# Patient Record
Sex: FEMALE | Race: WHITE | NOT HISPANIC OR LATINO | Employment: OTHER | URBAN - METROPOLITAN AREA
[De-identification: names, ages, dates, MRNs, and addresses within clinical notes are randomized per-mention and may not be internally consistent; named-entity substitution may affect disease eponyms.]

---

## 2017-01-11 PROBLEM — K21.00 BILE REFLUX ESOPHAGITIS: Status: ACTIVE | Noted: 2017-01-11

## 2017-01-11 PROBLEM — Z90.3 S/P GASTRECTOMY: Status: ACTIVE | Noted: 2017-01-11

## 2017-01-11 PROBLEM — R13.14 PHARYNGOESOPHAGEAL DYSPHAGIA: Status: ACTIVE | Noted: 2017-01-11

## 2017-01-11 PROBLEM — K22.10 ULCER OF ESOPHAGUS WITHOUT BLEEDING: Status: ACTIVE | Noted: 2017-01-11

## 2017-04-05 ENCOUNTER — TELEPHONE (OUTPATIENT)
Dept: GASTROENTEROLOGY | Facility: CLINIC | Age: 72
End: 2017-04-05

## 2017-04-05 DIAGNOSIS — C16.9 MALIGNANT NEOPLASM OF STOMACH, UNSPECIFIED LOCATION: Primary | ICD-10-CM

## 2017-04-10 ENCOUNTER — TELEPHONE (OUTPATIENT)
Dept: GASTROENTEROLOGY | Facility: CLINIC | Age: 72
End: 2017-04-10

## 2017-04-21 ENCOUNTER — TELEPHONE (OUTPATIENT)
Dept: ENDOSCOPY | Facility: HOSPITAL | Age: 72
End: 2017-04-21

## 2017-04-24 ENCOUNTER — TELEPHONE (OUTPATIENT)
Dept: ENDOSCOPY | Facility: HOSPITAL | Age: 72
End: 2017-04-24

## 2017-04-24 NOTE — TELEPHONE ENCOUNTER
Contacted patient regarding EGD/EUS scheduled 5/1/17 at 1000. Reviewed patient's medical history, allergies, and medications. Verbally reviewed prep instructions with patient. Patient verbalized understanding. Prep instructions e-mailed to patient at [email or web address]. Patient has no further questions at this time.

## 2017-04-30 ENCOUNTER — ANESTHESIA EVENT (OUTPATIENT)
Dept: ENDOSCOPY | Facility: HOSPITAL | Age: 72
End: 2017-04-30
Payer: MEDICARE

## 2017-05-01 ENCOUNTER — ANESTHESIA (OUTPATIENT)
Dept: ENDOSCOPY | Facility: HOSPITAL | Age: 72
End: 2017-05-01
Payer: MEDICARE

## 2017-05-01 ENCOUNTER — SURGERY (OUTPATIENT)
Age: 72
End: 2017-05-01

## 2017-05-01 ENCOUNTER — HOSPITAL ENCOUNTER (OUTPATIENT)
Facility: HOSPITAL | Age: 72
Discharge: HOME | End: 2017-05-01
Attending: INTERNAL MEDICINE | Admitting: INTERNAL MEDICINE
Payer: MEDICARE

## 2017-05-01 VITALS
RESPIRATION RATE: 17 BRPM | DIASTOLIC BLOOD PRESSURE: 59 MMHG | HEIGHT: 61 IN | SYSTOLIC BLOOD PRESSURE: 122 MMHG | HEART RATE: 69 BPM | WEIGHT: 94 LBS | OXYGEN SATURATION: 100 % | TEMPERATURE: 97 F | BODY MASS INDEX: 17.75 KG/M2

## 2017-05-01 DIAGNOSIS — Z90.3 S/P GASTRECTOMY: ICD-10-CM

## 2017-05-01 DIAGNOSIS — K21.00 BILE REFLUX ESOPHAGITIS: ICD-10-CM

## 2017-05-01 DIAGNOSIS — R10.13 EPIGASTRIC ABDOMINAL PAIN: Primary | ICD-10-CM

## 2017-05-01 DIAGNOSIS — R63.4 WEIGHT LOSS: ICD-10-CM

## 2017-05-01 PROCEDURE — D9220A PRA ANESTHESIA: Mod: ANES,,, | Performed by: ANESTHESIOLOGY

## 2017-05-01 PROCEDURE — 37000008 HC ANESTHESIA 1ST 15 MINUTES: Performed by: INTERNAL MEDICINE

## 2017-05-01 PROCEDURE — 25000003 PHARM REV CODE 250: Performed by: INTERNAL MEDICINE

## 2017-05-01 PROCEDURE — 37000009 HC ANESTHESIA EA ADD 15 MINS: Performed by: INTERNAL MEDICINE

## 2017-05-01 PROCEDURE — D9220A PRA ANESTHESIA: Mod: CRNA,,, | Performed by: NURSE ANESTHETIST, CERTIFIED REGISTERED

## 2017-05-01 PROCEDURE — 43259 EGD US EXAM DUODENUM/JEJUNUM: CPT | Performed by: INTERNAL MEDICINE

## 2017-05-01 PROCEDURE — 25000003 PHARM REV CODE 250: Performed by: NURSE ANESTHETIST, CERTIFIED REGISTERED

## 2017-05-01 PROCEDURE — 63600175 PHARM REV CODE 636 W HCPCS: Performed by: NURSE ANESTHETIST, CERTIFIED REGISTERED

## 2017-05-01 PROCEDURE — 43259 EGD US EXAM DUODENUM/JEJUNUM: CPT | Mod: ,,, | Performed by: INTERNAL MEDICINE

## 2017-05-01 RX ORDER — PROPOFOL 10 MG/ML
VIAL (ML) INTRAVENOUS
Status: DISCONTINUED | OUTPATIENT
Start: 2017-05-01 | End: 2017-05-01

## 2017-05-01 RX ORDER — LIDOCAINE HCL/PF 100 MG/5ML
SYRINGE (ML) INTRAVENOUS
Status: DISCONTINUED | OUTPATIENT
Start: 2017-05-01 | End: 2017-05-01

## 2017-05-01 RX ORDER — MIDAZOLAM HYDROCHLORIDE 1 MG/ML
INJECTION, SOLUTION INTRAMUSCULAR; INTRAVENOUS
Status: DISCONTINUED | OUTPATIENT
Start: 2017-05-01 | End: 2017-05-01

## 2017-05-01 RX ORDER — SODIUM CHLORIDE 9 MG/ML
INJECTION, SOLUTION INTRAVENOUS CONTINUOUS
Status: DISCONTINUED | OUTPATIENT
Start: 2017-05-01 | End: 2017-05-01 | Stop reason: HOSPADM

## 2017-05-01 RX ORDER — PROPOFOL 10 MG/ML
VIAL (ML) INTRAVENOUS CONTINUOUS PRN
Status: DISCONTINUED | OUTPATIENT
Start: 2017-05-01 | End: 2017-05-01

## 2017-05-01 RX ADMIN — SODIUM CHLORIDE: 0.9 INJECTION, SOLUTION INTRAVENOUS at 09:05

## 2017-05-01 RX ADMIN — LIDOCAINE HYDROCHLORIDE 50 MG: 20 INJECTION, SOLUTION INTRAVENOUS at 09:05

## 2017-05-01 RX ADMIN — MIDAZOLAM HYDROCHLORIDE 1 MG: 1 INJECTION, SOLUTION INTRAMUSCULAR; INTRAVENOUS at 09:05

## 2017-05-01 RX ADMIN — PROPOFOL 50 MG: 10 INJECTION, EMULSION INTRAVENOUS at 09:05

## 2017-05-01 RX ADMIN — PROPOFOL 150 MCG/KG/MIN: 10 INJECTION, EMULSION INTRAVENOUS at 09:05

## 2017-05-01 NOTE — DISCHARGE INSTRUCTIONS

## 2017-05-01 NOTE — ANESTHESIA POSTPROCEDURE EVALUATION
"Anesthesia Post Evaluation    Patient: Rosaline Skinner    Procedure(s) Performed: Procedure(s) (LRB):  ESOPHAGOGASTRODUODENOSCOPY (EGD) (N/A)  ULTRASOUND-ENDOSCOPIC-UPPER (N/A)    Final Anesthesia Type: general  Patient location during evaluation: PACU  Patient participation: Yes- Able to Participate  Level of consciousness: awake and alert, awake and oriented  Post-procedure vital signs: reviewed and stable  Pain management: adequate  Airway patency: patent  PONV status at discharge: No PONV  Anesthetic complications: no      Cardiovascular status: blood pressure returned to baseline and hemodynamically stable  Respiratory status: unassisted, spontaneous ventilation and room air  Hydration status: euvolemic  Follow-up not needed.        Visit Vitals    BP (!) 99/57 (BP Location: Right arm, BP Method: Automatic)    Pulse (!) 55    Temp 36.2 °C (97.2 °F) (Skin)    Resp (!) 21    Ht 5' 1" (1.549 m)    Wt 42.6 kg (94 lb)    LMP  (LMP Unknown)    SpO2 100%    Breastfeeding No    BMI 17.76 kg/m2       Pain/Demarco Score: Pain Assessment Performed: Yes (5/1/2017  9:57 AM)  Presence of Pain: non-verbal indicators absent (5/1/2017  9:57 AM)  Demarco Score: 9 (5/1/2017  9:57 AM)      "

## 2017-05-01 NOTE — H&P
Short Stay Endoscopy History and Physical    PCP - Halima Salvador MD  Referring Physician - Nish Ordaz MD  [facility] [address] [place] [address]    Procedure - EGD/EUS  ASA - per anesthesia  Mallampati - per anesthesia  History of Anesthesia problems - no  Family history Anesthesia problems -  no   Plan of anesthesia - General    HPI:  This is a 71 y.o. female here for evaluation of: epigastric pain; wt loss    Reflux - no  Dysphagia - no  Abdominal pain - POS  Diarrhea - no    ROS:  Constitutional: No fevers, chills, POS weight loss  CV: No chest pain  Pulm: No cough, No shortness of breath  Ophtho: No vision changes  GI: see HPI  Derm: No rash    Medical History:  has a past medical history of Allergy; Cancer; Colon cancer; DDD (degenerative disc disease), cervical; Glaucoma; MGD (meibomian gland disease); Migraines; and Osteopenia.    Surgical History:  has a past surgical history that includes Appendectomy; Total hip arthroplasty (Bilateral); Hysterectomy; Tonsillectomy; Revision total hip arthroplasty; Cholecystectomy; Abdominal surgery; Stomach surgery; and Gastrectomy.    Family History: family history includes Heart disease in her father; Hyperlipidemia in her father; Hypertension in her father; No Known Problems in her mother..    Social History:  reports that she has never smoked. She has never used smokeless tobacco. She reports that she drinks about 2.4 oz of alcohol per week  She reports that she does not use illicit drugs.    Review of patient's allergies indicates:   Allergen Reactions    Cephalosporins     Macrobid [nitrofurantoin monohyd/m-cryst]     Sulfa (sulfonamide antibiotics) Hives       Medications:   Prescriptions Prior to Admission   Medication Sig Dispense Refill Last Dose    alprazolam (XANAX) 0.25 MG tablet Take 0.25 mg by mouth nightly as needed for Anxiety. (Patient taking differently: Take 0.5 mg by mouth nightly as needed  for Anxiety. )   Past Month at Unknown time    CARAFATE 100 mg/mL suspension    4/30/2017 at Unknown time    cyanocobalamin, vitamin B-12, 5,000 mcg/mL Drop Place 5,000 mcg under the tongue once daily.   4/30/2017 at Unknown time    finasteride (PROSCAR) 5 mg tablet Take 5 mg by mouth once daily.   4/30/2017 at Unknown time    fluorometholone 0.1% (FML) 0.1 % DrpS INSTILL 1 DROP BOTH EYES TWICE A DAY  3 4/30/2017 at Unknown time    hydrocodone-acetaminophen 10-325mg (NORCO)  mg Tab Take 1 tablet by mouth every 4 to 6 hours as needed. 100 tablet 0 Past Week at Unknown time    mirtazapine (REMERON) 30 MG tablet TAKE 1 TABLET (30 MG TOTAL) BY MOUTH EVERY EVENING. 90 tablet 1 4/30/2017 at Unknown time    montelukast (SINGULAIR) 10 mg tablet Take 10 mg by mouth once daily.  3 Past Week at Unknown time    ondansetron (ZOFRAN-ODT) 8 MG TbDL Take 1 tablet (8 mg total) by mouth every 6 (six) hours as needed. 20 tablet 2 Past Week at Unknown time    timolol maleate 0.5% (TIMOPTIC) 0.5 % Drop 1 drop 2 (two) times daily.   4/30/2017 at Unknown time    vitamin D 1000 units Tab Take 1 tablet (1,000 Units total) by mouth once daily.   Past Week at Unknown time    zolpidem (AMBIEN) 10 mg Tab Take 1 tablet (10 mg total) by mouth every evening. 30 tablet 1 4/30/2017 at Unknown time    azelastine (ASTELIN) 137 mcg (0.1 %) nasal spray 2 sprays once daily.  3 More than a month at Unknown time    prochlorperazine (COMPAZINE) 10 MG tablet Take 10 mg by mouth every 6 (six) hours as needed.   More than a month at Unknown time    promethazine (PHENERGAN) 25 MG tablet Take 25 mg by mouth every 6 (six) hours as needed for Nausea.   More than a month at Unknown time    water Liqd 150 mL with MILK OF MAGNESIA 400 mg/5 mL Susp 400 mg, diphenhydrAMINE 12.5 mg/5 mL Elix 60 mg, nystatin 100,000 unit/mL Susp 500,000 Units Take 5 mLs by mouth.   More than a month at Unknown time       Physical Exam:    Vital Signs:   Vitals:     05/01/17 0906   BP: (!) 156/76   Pulse: 60   Resp: 16       General Appearance: Well appearing in no acute distress  Eyes:    No scleral icterus  ENT: Neck supple  Lungs: CTA anteriorly  Heart:  Regular rate  Abdomen: Soft, non tender, non distended with normal bowel sounds.  Extremities: No edema  Skin: No rash    Labs:  Lab Results   Component Value Date    WBC 7.37 04/18/2017    HGB 12.4 04/18/2017    HCT 36.7 (L) 04/18/2017     04/18/2017    CHOL 164 09/13/2016    TRIG 144 09/13/2016    HDL 64 09/13/2016    ALT 21 04/18/2017    AST 27 04/18/2017     04/18/2017    K 4.2 04/18/2017     04/18/2017    CREATININE 0.62 04/18/2017    BUN 15 04/18/2017    CO2 28 04/18/2017    TSH 2.140 03/12/2016    INR 1.6 03/17/2016       I have explained the risks and benefits of this endoscopic procedure to the patient including but not limited to bleeding, inflammation, infection, perforation, and death.      Luis Moore MD

## 2017-05-01 NOTE — ANESTHESIA RELEASE NOTE
"Anesthesia Release from PACU Note    Patient: Rosaline Skinner    Procedure(s) Performed: Procedure(s) (LRB):  ESOPHAGOGASTRODUODENOSCOPY (EGD) (N/A)  ULTRASOUND-ENDOSCOPIC-UPPER (N/A)    Anesthesia type: general    Post pain: Adequate analgesia    Post assessment: no apparent anesthetic complications, tolerated procedure well and no evidence of recall    Last Vitals:   Visit Vitals    BP (!) 99/57 (BP Location: Right arm, BP Method: Automatic)    Pulse (!) 55    Temp 36.2 °C (97.2 °F) (Skin)    Resp (!) 21    Ht 5' 1" (1.549 m)    Wt 42.6 kg (94 lb)    LMP  (LMP Unknown)    SpO2 100%    Breastfeeding No    BMI 17.76 kg/m2       Post vital signs: stable    Level of consciousness: awake, alert  and oriented    Nausea/Vomiting: no nausea/no vomiting    Complications: none    Airway Patency: patent    Respiratory: unassisted, spontaneous ventilation, room air    Cardiovascular: stable and blood pressure at baseline    Hydration: euvolemic  "

## 2017-05-01 NOTE — TRANSFER OF CARE
"Anesthesia Transfer of Care Note    Patient: Rosaline Skinner    Procedure(s) Performed: Procedure(s) (LRB):  ESOPHAGOGASTRODUODENOSCOPY (EGD) (N/A)  ULTRASOUND-ENDOSCOPIC-UPPER (N/A)    Patient location: PACU    Anesthesia Type: general    Transport from OR: Transported from OR on 2-3 L/min O2 by NC with adequate spontaneous ventilation    Post pain: adequate analgesia    Post assessment: no apparent anesthetic complications    Post vital signs: stable    Level of consciousness: sedated    Nausea/Vomiting: no nausea/vomiting    Complications: none          Last vitals:   Visit Vitals    BP (!) 99/57 (BP Location: Right arm, BP Method: Automatic)    Pulse (!) 55    Temp 36.2 °C (97.2 °F) (Skin)    Resp (!) 21    Ht 5' 1" (1.549 m)    Wt 42.6 kg (94 lb)    LMP  (LMP Unknown)    SpO2 100%    Breastfeeding No    BMI 17.76 kg/m2     "

## 2017-05-01 NOTE — IP AVS SNAPSHOT
[facility]  [address]  [facility] [address]  Phone: [phone]           Patient Discharge Instructions   Our goal is to set you up for success. This packet includes information on your condition, medications, and your home care.  It will help you care for yourself to prevent having to return to the hospital.     Please ask your nurse if you have any questions.      There are many details to remember when preparing to leave the hospital. Here is what you will need to do:    1. Take your medicine. If you are prescribed medications, review your Medication List on the following pages. You may have new medications to  at the pharmacy and others that you'll need to stop taking. Review the instructions for how and when to take your medications. Talk with your doctor or nurses if you are unsure of what to do.     2. Go to your follow-up appointments. Specific follow-up information is listed in the following pages. Your may be contacted by a nurse or clinical provider about future appointments. Be sure we have all of the phone numbers to reach you. Please contact your provider's office if you are unable to make an appointment.     3. Watch for warning signs. Your doctor or nurse will give you detailed warning signs to watch for and when to call for assistance. These instructions may also include educational information about your condition. If you experience any of warning signs to your health, call your doctor.           [facility]  Unless otherwise directed by your provider, please   contact [facility], our nurse care line   that is available for 24/7 assistance.     [phone] (toll-free)     Registered nurses in the [facility]   provide: appointment scheduling, clinical advisement, health education, and other advisory services.                  ** Verify the list of medication(s) below is accurate and up to date. Carry this with you in case of  emergency. If your medications have changed, please notify your healthcare provider.             Medication List      ASK your doctor about these medications        Additional Info                      alprazolam 0.25 MG tablet   Commonly known as:  XANAX   Refills:  0   Dose:  0.25 mg    Instructions:  Take 0.25 mg by mouth nightly as needed for Anxiety.     Begin Date    AM    Noon    PM    Bedtime       azelastine 137 mcg (0.1 %) nasal spray   Commonly known as:  ASTELIN   Refills:  3   Dose:  2 spray    Instructions:  2 sprays once daily.     Begin Date    AM    Noon    PM    Bedtime       CARAFATE 100 mg/mL suspension   Refills:  0   Generic drug:  sucralfate      Begin Date    AM    Noon    PM    Bedtime       cyanocobalamin (vitamin B-12) 5,000 mcg/mL Drop   Refills:  0   Dose:  5000 mcg    Instructions:  Place 5,000 mcg under the tongue once daily.     Begin Date    AM    Noon    PM    Bedtime       finasteride 5 mg tablet   Commonly known as:  PROSCAR   Refills:  0   Dose:  5 mg    Instructions:  Take 5 mg by mouth once daily.     Begin Date    AM    Noon    PM    Bedtime       fluorometholone 0.1% 0.1 % Drps   Commonly known as:  FML   Refills:  3    Instructions:  INSTILL 1 DROP BOTH EYES TWICE A DAY     Begin Date    AM    Noon    PM    Bedtime       hydrocodone-acetaminophen 10-325mg  mg Tab   Commonly known as:  NORCO   Quantity:  100 tablet   Refills:  0   Dose:  1 tablet    Instructions:  Take 1 tablet by mouth every 4 to 6 hours as needed.     Begin Date    AM    Noon    PM    Bedtime       mirtazapine 30 MG tablet   Commonly known as:  REMERON   Quantity:  90 tablet   Refills:  1    Instructions:  TAKE 1 TABLET (30 MG TOTAL) BY MOUTH EVERY EVENING.     Begin Date    AM    Noon    PM    Bedtime       montelukast 10 mg tablet   Commonly known as:  SINGULAIR   Refills:  3   Dose:  10 mg    Instructions:  Take 10 mg by mouth once daily.     Begin Date    AM    Noon    PM    Bedtime        ondansetron 8 MG Tbdl   Commonly known as:  ZOFRAN-ODT   Quantity:  20 tablet   Refills:  2   Dose:  8 mg    Instructions:  Take 1 tablet (8 mg total) by mouth every 6 (six) hours as needed.     Begin Date    AM    Noon    PM    Bedtime       prochlorperazine 10 MG tablet   Commonly known as:  COMPAZINE   Refills:  0   Dose:  10 mg    Instructions:  Take 10 mg by mouth every 6 (six) hours as needed.     Begin Date    AM    Noon    PM    Bedtime       promethazine 25 MG tablet   Commonly known as:  PHENERGAN   Refills:  0   Dose:  25 mg    Instructions:  Take 25 mg by mouth every 6 (six) hours as needed for Nausea.     Begin Date    AM    Noon    PM    Bedtime       timolol maleate 0.5% 0.5 % Drop   Commonly known as:  TIMOPTIC   Refills:  0   Dose:  1 drop    Instructions:  1 drop 2 (two) times daily.     Begin Date    AM    Noon    PM    Bedtime       vitamin D 1000 units Tab   Refills:  0   Dose:  1000 Units    Instructions:  Take 1 tablet (1,000 Units total) by mouth once daily.     Begin Date    AM    Noon    PM    Bedtime       water Liqd 150 mL with MILK OF MAGNESIA 400 mg/5 mL Susp 400 mg, diphenhydrAMINE 12.5 mg/5 mL Elix 60 mg, nystatin 100,000 unit/mL Susp 500,000 Units   Refills:  0   Dose:  5 mL    Instructions:  Take 5 mLs by mouth.     Begin Date    AM    Noon    PM    Bedtime       zolpidem 10 mg Tab   Commonly known as:  AMBIEN   Quantity:  30 tablet   Refills:  1   Dose:  10 mg    Instructions:  Take 1 tablet (10 mg total) by mouth every evening.     Begin Date    AM    Noon    PM    Bedtime                  Please bring to all follow up appointments:    1. A copy of your discharge instructions.  2. All medicines you are currently taking in their original bottles.  3. Identification and insurance card.    Please arrive 15 minutes ahead of scheduled appointment time.    Please call 24 hours in advance if you must reschedule your appointment and/or time.        Your Scheduled Appointments     May 15,  2017  8:45 AM CDT   Established Patient Visit with Servando Duarte MD   [facility] ([place]  Brenda Pugh)    [address] [facility] [id number] [phone]                  Discharge Instructions         Upper GI Endoscopy     During endoscopy, a long, flexible tube is used to view the inside of your upper GI tract.      Upper GI endoscopy allows your healthcare provider to look directly into the beginning of your gastrointestinal (GI) tract. The esophagus, stomach, and duodenum (the first part of the small intestine) make up the upper GI tract.   Before the exam  Follow these and any other instructions you are given before your endoscopy. If you dont follow the healthcare providers instructions carefully, the test may need to be canceled or done over:  · Don't eat or drink anything after midnight the night before your exam. If your exam is in the afternoon, drink only clear liquids in the morning. Don't eat or drink anything for 8 hours before the exam. In some cases, you may be able to take medicines with sips of water until 2 hours before the procedure. Speak with your healthcare provider about this.   · Bring your X-rays and any other test results you have.  · Because you will be sedated, arrange for an adult to drive you home after the exam.  · Tell your healthcare provider before the exam if you are taking any medicines or have any medical problems.  The procedure  Here is what to expect:  · You will lie on the endoscopy table. Usually patients lie on the left side.  · You will be monitored and given oxygen.  · Your throat may be numbed with a spray or gargle. You are given medicine through an intravenous (IV) line that will help you relax and remain comfortable. You may be awake or asleep during the procedure.  · The healthcare provider will put the endoscope in your mouth and down your esophagus. It is thinner than most pieces of food that you swallow. It will not  "affect your breathing. The medicine helps keep you from gagging.  · Air is put into your GI tract to expand it. It can make you burp.  · During the procedure, the healthcare provider can take biopsies (tissue samples), remove abnormalities, such as polyps, or treat abnormalities through a variety of devices placed through the endoscope. You will not feel this.   · The endoscope carries images of your upper GI tract to a video screen. If you are awake, you may be able to look at the images.  · After the procedure is done, you will rest for a time. An adult must drive you home.  When to call your healthcare provider  Contact your healthcare provider if you have:  · Black or tarry stools, or blood in your stool  · Fever  · Pain in your belly that does not go away  · Nausea and vomiting, or vomiting blood   Date Last Reviewed: 7/1/2016  © 0004-7252 [employer]. [address] [place] [address]. All rights reserved. This information is not intended as a substitute for professional medical care. Always follow your healthcare professional's instructions.            Admission Information     Date & Time Provider Department CSN    5/1/2017  8:53 AM Luis Moore MD [facility] [id number]      Care Providers     Provider Role Specialty Primary office phone    Luis Moore MD Attending Provider Gastroenterology [phone]    Luis Moore MD Surgeon  Gastroenterology [phone]      Your Vitals Were     BP Pulse Temp Resp Height Weight    99/57 (BP Location: Right arm, BP Method: Automatic) 55 97.2 °F (36.2 °C) (Skin) 21 5' 1" (1.549 m) 42.6 kg (94 lb)    Last Period SpO2 BMI          (LMP Unknown) 100% 17.76 kg/m2        Recent Lab Values     No lab values to display.      Allergies as of 5/1/2017        Reactions    Cephalosporins     Macrobid [Nitrofurantoin Monohyd/m-cryst]     Sulfa (Sulfonamide Antibiotics) Hives      Advance Directives     An advance directive is a " document which, in the event you are no longer able to make decisions for yourself, tells your healthcare team what kind of treatment you do or do not want to receive, or who you would like to make those decisions for you.  If you do not currently have an advance directive, [facility] encourages you to create one.  For more information call:  [phone]), [phone] ([phone]),  or log on to [email or web address].        Language Assistance Services     ATTENTION: Language assistance services are available, free of charge. Please call [phone].      ATENCIÓN: Si habla español, tiene a martinez disposición servicios gratuitos de asistencia lingüística. Llame al [phone].     CHÚ Ý: N?u b?n nói Ti?ng Vi?t, có các d?ch v? h? tr? ngôn ng? mi?n phí dành cho b?n. G?i s? [phone].        Pneumonmia Discharge Instructions                 [facility] complies with applicable Federal civil rights laws and does not discriminate on the basis of race, color, national origin, age, disability, or sex.

## 2017-05-01 NOTE — ANESTHESIA PREPROCEDURE EVALUATION
05/01/2017  Rosaline Skinner is a 71 y.o., female with anxiety, anemia, gastric CA s/p resection, here for EGD/EUS.    Anesthesia Evaluation         Review of Systems  Anesthesia Hx:  Hx of Anesthetic complications (PONV)  History of prior surgery of interest to airway management or planning: Denies Family Hx of Anesthesia complications.  Personal Hx of Anesthesia complications, Post-Operative Nausea/Vomiting   Hematology/Oncology:         -- Anemia: Current/Recent Cancer.   Cardiovascular:   Exercise tolerance: good Denies Hypertension.   Denies Angina. ECG has been reviewed.    >4 mets  TTE 2015:  CONCLUSIONS   1 - Normal left ventricular systolic function (EF 60-65%).   2 - Normal diastolic function.  3 - Normal RV size and function      Pulmonary:   Denies Asthma.    Hepatic/GI:   PUD, Denies GERD.    Gastric CA s/p gastrectomy.  Dysphagia   Neurological:   Headaches Denies Seizures.    Endocrine:   Denies Diabetes.    Psych:   anxiety          Physical Exam  General:  Well nourished    Airway/Jaw/Neck:  Airway Findings: Mouth Opening: Small, but > 3cm Tongue: Normal  General Airway Assessment: Average, Adult, Possible difficult intubation  Oropharynx Findings:  Mallampati: III  Improves to III with phonation.  TM Distance: 4 - 6 cm  Jaw/Neck Findings:     Neck ROM: Normal ROM      Dental:  Dental Findings: (complete veneers upper and lower) lower front caps, upper front caps   Chest/Lungs:  Chest/Lungs Findings: Clear to auscultation, Normal Respiratory Rate     Heart/Vascular:  Heart Findings: Rate: Normal  Rhythm: Regular Rhythm        Mental Status:  Mental Status Findings:  Cooperative, Alert and Oriented         Anesthesia Plan  Type of Anesthesia, risks & benefits discussed:  Anesthesia Type:  general, MAC  Patient's Preference:   Intra-op Monitoring Plan: standard ASA monitors  Intra-op  Monitoring Plan Comments:   Post Op Pain Control Plan:   Post Op Pain Control Plan Comments:   Induction:   IV  Beta Blocker:  Patient is not currently on a Beta-Blocker (No further documentation required).       Informed Consent: Patient understands risks and agrees with Anesthesia plan.  Questions answered. Anesthesia consent signed with patient.  ASA Score: 2     Day of Surgery Review of History & Physical:    H&P update referred to the surgeon.         Ready For Surgery From Anesthesia Perspective.

## 2017-05-01 NOTE — PATIENT INSTRUCTIONS
Discharge Summary/Instructions for after an Upper EUS  Patient Name: Rosaline Skinner  Patient MRN: [id number]  Patient YOB: 1945  Monday, May 01, 2017    Luis Moore MD  1.  Do Not eat or drink anything for 1 hour.  Try sips of water first.  If   tolerated, resume your regular diet or one recommended by your physician.  2.  Do not drive, operate machinery, make critical decisions, or do   activities that require coordination or balance for 24 hours.  3.  You may experience a sore throat for 24 to 48 hours.  You may use throat   lozenges or gargle with warm salt water to relieve the discomfort.  4.  Because air was put into your stomach during the procedure, you may   experience some belching.  5.  Go directly to the emergency room if you notice any of the following:   Chills and/or fever over 101   Persistent vomiting or vomiting with blood   Severe abdominal pain, other than gas cramps   Severe chest pain   Black, tarry stools  Your doctor recommends these additional instructions:  - Discharge patient to home (ambulatory).   - Resume previous diet; Discharge to home (ambulatory); Resume outpatient   medications  - Return to referring physician as previously scheduled.   - Suspect sxms of pain with oral intake may be related to persistent   esophagitis. Smaller volume and mechanical soft meals may help with   symptoms. Consider liquid antacid shortly before meals.  You are being discharged to home.   Return to your referring physician as previously scheduled.  Discharge patient to home (ambulatory).   Return to referring physician as previously scheduled.  If you have any questions on the above instructions, call the GI Lab and ask   for an endoscopy nurse.  If you have any problems, please call your physician.  EMERGENCY PHONE NUMBER: [phone]  LAB RESULTS: [phone]  Luis Moore MD  5/1/2017 10:23:23 AM  This report has been verified and signed electronically.

## 2017-05-12 ENCOUNTER — PATIENT MESSAGE (OUTPATIENT)
Dept: GASTROENTEROLOGY | Facility: CLINIC | Age: 72
End: 2017-05-12

## 2017-05-15 PROBLEM — C16.9 MALIGNANT NEOPLASM OF STOMACH: Status: ACTIVE | Noted: 2017-05-15

## 2017-08-24 ENCOUNTER — DOCUMENTATION ONLY (OUTPATIENT)
Dept: INFUSION THERAPY | Facility: HOSPITAL | Age: 72
End: 2017-08-24

## 2017-08-24 NOTE — PROGRESS NOTES
Nutrition: Received consult from Dr. Maxi Munroe from [facility] for PEJ tube feeding recommendations. Patient saw Dr. Ordaz, who recommended patient have a J tube placed. Dr. Munroe will place J-tube. Called and spoke with patient regarding feeding regimen, formula, bowels and current oral intake. Wt: 92#s Recommended Nutren 1.5. Administer Nutren 1.5 via pump at goal rate of 65ml hour for 12 hours. Recommendations yield: 1170kcals. Sent signed orders over to [facility] in [facility]. Will continue to follow up with pt for assistance. Lisandra Hardy, MS, RD, LDN

## 2017-09-02 PROBLEM — E43 SEVERE MALNUTRITION: Status: ACTIVE | Noted: 2017-09-02

## 2017-09-02 PROBLEM — R11.0 NAUSEA: Status: ACTIVE | Noted: 2017-09-02

## 2017-09-05 ENCOUNTER — DOCUMENTATION ONLY (OUTPATIENT)
Dept: INFUSION THERAPY | Facility: HOSPITAL | Age: 72
End: 2017-09-05

## 2017-09-05 NOTE — PROGRESS NOTES
Nutrition: Pts  called me today to problem solve about patients PEJ tube. States pt had lots of bloating and a hard abdomin after surgery. States patient had several bowel movements last night and feeling much better. Eric, patients  informed me that the feedings are being administered nicely however Mr. Reyes is having a hard time flushing the tube with a syringe. Unsure why he is having a hard time flushing tube because tube was flushing well while patient was at [facility]. Will continue to assist patient and pts  as needed. Encouraged patient to call surgeons office if he thinks something may be wrong with the patients PEJ tube. Lisandra Hardy,MS, RD, LDN

## 2017-09-13 ENCOUNTER — DOCUMENTATION ONLY (OUTPATIENT)
Dept: INFUSION THERAPY | Facility: HOSPITAL | Age: 72
End: 2017-09-13

## 2017-09-13 NOTE — PROGRESS NOTES
Nutrition: Patients  called me today inquiring about additional fluids pt needs to consume orally to ensure patient is staying hydrated. Answered patients questions. Provided encouragement and support. Encouraged patient to call with questions. Will assist if and when needed. Lisandra Hardy,MS, RD, LDN

## 2017-09-21 ENCOUNTER — DOCUMENTATION ONLY (OUTPATIENT)
Dept: INFUSION THERAPY | Facility: HOSPITAL | Age: 72
End: 2017-09-21

## 2017-09-21 NOTE — PROGRESS NOTES
Nutrition: Late entry. Pts , Eric called me on 9/20/2017 to discuss increasing patients tube feeding. Eric is concerned patient is not gaining weight and may be losing weight. Discussed increasing PEJ tube pump to 84mL hour for 12 hours to administer 4 cartons of nutren 1.5. Offered encouragement and support. Will assist as needed. Lisandra Hardy,MS, RD ,LDN

## 2017-10-05 ENCOUNTER — DOCUMENTATION ONLY (OUTPATIENT)
Dept: INFUSION THERAPY | Facility: HOSPITAL | Age: 72
End: 2017-10-05

## 2017-10-05 NOTE — PROGRESS NOTES
Nutrition: Late entry. met with pt and pts family on 10/4/2017 regarding ulcers patient is experiencing. Discussed the importance of still feeding the stomach even though she is takeing 100% of her nutrition via PEJ tube. Pt verbalized understanding. PT informed me she recently gained 3 #s and is happy and hopes to continue to gain. Will continue to assist as needed and follow up. Lisandra Hardy,MS, RD,LDN

## 2017-12-23 ENCOUNTER — OFFICE VISIT (OUTPATIENT)
Dept: URGENT CARE | Facility: CLINIC | Age: 72
End: 2017-12-23
Payer: MEDICARE

## 2017-12-23 VITALS
HEIGHT: 61 IN | HEART RATE: 94 BPM | TEMPERATURE: 99 F | DIASTOLIC BLOOD PRESSURE: 70 MMHG | SYSTOLIC BLOOD PRESSURE: 110 MMHG | RESPIRATION RATE: 16 BRPM | WEIGHT: 93 LBS | BODY MASS INDEX: 17.56 KG/M2 | OXYGEN SATURATION: 99 %

## 2017-12-23 DIAGNOSIS — R50.9 FEVER, UNSPECIFIED FEVER CAUSE: Primary | ICD-10-CM

## 2017-12-23 DIAGNOSIS — J11.1 INFLUENZA: ICD-10-CM

## 2017-12-23 LAB
CTP QC/QA: YES
FLUAV AG NPH QL: NEGATIVE
FLUBV AG NPH QL: NEGATIVE

## 2017-12-23 PROCEDURE — 87804 INFLUENZA ASSAY W/OPTIC: CPT | Mod: QW,S$GLB,, | Performed by: EMERGENCY MEDICINE

## 2017-12-23 PROCEDURE — 99213 OFFICE O/P EST LOW 20 MIN: CPT | Mod: 25,S$GLB,, | Performed by: EMERGENCY MEDICINE

## 2017-12-23 RX ORDER — OSELTAMIVIR PHOSPHATE 75 MG/1
75 CAPSULE ORAL 2 TIMES DAILY
Qty: 20 CAPSULE | Refills: 0 | Status: SHIPPED | OUTPATIENT
Start: 2017-12-23 | End: 2018-01-02

## 2017-12-23 NOTE — PROGRESS NOTES
"Subjective:       Patient ID: Rosaline Skinner is a 72 y.o. female.    Vitals:  height is 5' 1" (1.549 m) and weight is 42.2 kg (93 lb). Her temperature is 99 °F (37.2 °C). Her blood pressure is 110/70 and her pulse is 94. Her respiration is 16 and oxygen saturation is 99%.     Chief Complaint: Fever and Generalized Body Aches    Fever    This is a new problem. The current episode started yesterday. The problem has been gradually worsening. Associated symptoms include congestion, muscle aches, nausea and a sore throat. Pertinent negatives include no abdominal pain, chest pain, coughing, ear pain, headaches or wheezing.     Review of Systems   Constitution: Positive for fever. Negative for chills and malaise/fatigue.   HENT: Positive for congestion and sore throat. Negative for ear pain and hoarse voice.    Eyes: Negative for discharge and redness.   Cardiovascular: Negative for chest pain, dyspnea on exertion and leg swelling.   Respiratory: Negative for cough, shortness of breath, sputum production and wheezing.    Musculoskeletal: Negative for myalgias.   Gastrointestinal: Positive for nausea. Negative for abdominal pain.   Neurological: Negative for headaches.       Objective:      Physical Exam   Constitutional: She is oriented to person, place, and time. She appears well-developed. She is cooperative.  Non-toxic appearance. She does not appear ill. No distress.   HENT:   Head: Normocephalic and atraumatic.   Right Ear: Hearing, tympanic membrane, external ear and ear canal normal.   Left Ear: Hearing, tympanic membrane, external ear and ear canal normal.   Nose: Nose normal. No mucosal edema, rhinorrhea or nasal deformity. No epistaxis. Right sinus exhibits no maxillary sinus tenderness and no frontal sinus tenderness. Left sinus exhibits no maxillary sinus tenderness and no frontal sinus tenderness.   Mouth/Throat: Uvula is midline, oropharynx is clear and moist and mucous membranes are normal. No trismus in " the jaw. Normal dentition. No uvula swelling. No posterior oropharyngeal erythema.   Eyes: Conjunctivae and lids are normal. No scleral icterus.   Sclera clear bilat   Neck: Trachea normal, full passive range of motion without pain and phonation normal.   Cardiovascular: Normal rate, regular rhythm, normal heart sounds and normal pulses.    Pulmonary/Chest: Effort normal and breath sounds normal. No respiratory distress.   Abdominal: Normal appearance.   Musculoskeletal: Normal range of motion. She exhibits no edema or deformity.   Neurological: She is alert and oriented to person, place, and time. She exhibits normal muscle tone. Coordination normal.   Skin: Skin is warm, dry and intact. She is not diaphoretic. No pallor.   Psychiatric: She has a normal mood and affect. Her speech is normal and behavior is normal. Judgment and thought content normal. Cognition and memory are normal.   Nursing note and vitals reviewed.      Assessment:       1. Fever, unspecified fever cause    2. Influenza        Plan:         Fever, unspecified fever cause  -     POCT Influenza A/B    Influenza  -     oseltamivir (TAMIFLU) 75 MG capsule; Take 1 capsule (75 mg total) by mouth 2 (two) times daily.  Dispense: 20 capsule; Refill: 0

## 2018-02-05 PROBLEM — R13.14 PHARYNGOESOPHAGEAL DYSPHAGIA: Status: RESOLVED | Noted: 2017-01-11 | Resolved: 2018-02-05

## 2018-02-05 PROBLEM — R10.13 EPIGASTRIC ABDOMINAL PAIN: Status: RESOLVED | Noted: 2017-05-01 | Resolved: 2018-02-05

## 2018-02-05 PROBLEM — K21.00 BILE REFLUX ESOPHAGITIS: Status: RESOLVED | Noted: 2017-01-11 | Resolved: 2018-02-05

## 2018-02-19 PROBLEM — N17.9 AKI (ACUTE KIDNEY INJURY): Status: ACTIVE | Noted: 2018-02-19

## 2018-02-19 PROBLEM — E87.1 HYPONATREMIA: Status: ACTIVE | Noted: 2018-02-19

## 2018-02-19 PROBLEM — N30.00 ACUTE CYSTITIS WITHOUT HEMATURIA: Status: ACTIVE | Noted: 2018-02-19

## 2018-09-10 PROBLEM — E87.5 HYPERKALEMIA: Status: ACTIVE | Noted: 2018-09-10

## 2018-09-10 PROBLEM — G45.9 TIA (TRANSIENT ISCHEMIC ATTACK): Status: ACTIVE | Noted: 2018-09-10

## 2018-09-10 PROBLEM — R47.01 APHASIA: Status: ACTIVE | Noted: 2018-09-10

## 2018-09-10 PROBLEM — R74.01 TRANSAMINITIS: Status: ACTIVE | Noted: 2018-09-10

## 2019-07-13 PROBLEM — N30.01 ACUTE CYSTITIS WITH HEMATURIA: Status: ACTIVE | Noted: 2019-07-13

## 2019-07-14 PROBLEM — R41.82 ALTERED MENTAL STATUS: Status: ACTIVE | Noted: 2019-07-14

## 2019-07-14 PROBLEM — E87.6 HYPOKALEMIA: Status: ACTIVE | Noted: 2019-07-14

## 2019-07-23 ENCOUNTER — LAB VISIT (OUTPATIENT)
Dept: LAB | Facility: HOSPITAL | Age: 74
End: 2019-07-23
Attending: INTERNAL MEDICINE
Payer: MEDICARE

## 2019-07-23 DIAGNOSIS — D64.9 ANEMIA, UNSPECIFIED TYPE: ICD-10-CM

## 2019-07-23 DIAGNOSIS — E53.8 VITAMIN B12 DEFICIENCY: ICD-10-CM

## 2019-07-23 LAB
FERRITIN SERPL-MCNC: 44 NG/ML (ref 12–264)
IRON SATN MFR SERPL: 13 % (ref 20–50)
IRON SERPL-MCNC: 45 UG/DL (ref 30–160)
TOTAL IRON BINDING CAPACITY: 344 UG/DL (ref 265–497)
VIT B12 SERPL-MCNC: >1000 PG/ML (ref 210–950)

## 2019-07-23 PROCEDURE — 82728 ASSAY OF FERRITIN: CPT | Mod: PN

## 2019-07-23 PROCEDURE — 36415 COLL VENOUS BLD VENIPUNCTURE: CPT | Mod: PN

## 2019-07-23 PROCEDURE — 82607 VITAMIN B-12: CPT

## 2019-07-23 PROCEDURE — 82728 ASSAY OF FERRITIN: CPT

## 2019-07-23 PROCEDURE — 83540 ASSAY OF IRON: CPT

## 2019-07-23 PROCEDURE — 83540 ASSAY OF IRON: CPT | Mod: PN

## 2019-07-23 PROCEDURE — 82607 VITAMIN B-12: CPT | Mod: PN

## 2019-08-05 PROBLEM — R47.01 APHASIA: Status: RESOLVED | Noted: 2018-09-10 | Resolved: 2019-08-05

## 2019-08-07 PROBLEM — E87.1 HYPONATREMIA: Status: RESOLVED | Noted: 2018-02-19 | Resolved: 2019-08-07

## 2019-08-07 PROBLEM — N30.01 ACUTE CYSTITIS WITH HEMATURIA: Status: RESOLVED | Noted: 2019-07-13 | Resolved: 2019-08-07

## 2019-08-07 PROBLEM — R74.01 TRANSAMINITIS: Status: RESOLVED | Noted: 2018-09-10 | Resolved: 2019-08-07

## 2019-08-07 PROBLEM — E87.6 HYPOKALEMIA: Status: RESOLVED | Noted: 2019-07-14 | Resolved: 2019-08-07

## 2019-08-07 PROBLEM — N30.00 ACUTE CYSTITIS WITHOUT HEMATURIA: Status: RESOLVED | Noted: 2018-02-19 | Resolved: 2019-08-07

## 2019-08-07 PROBLEM — R41.82 ALTERED MENTAL STATUS: Status: RESOLVED | Noted: 2019-07-14 | Resolved: 2019-08-07

## 2019-08-07 PROBLEM — N17.9 AKI (ACUTE KIDNEY INJURY): Status: RESOLVED | Noted: 2018-02-19 | Resolved: 2019-08-07

## 2019-08-07 PROBLEM — E87.5 HYPERKALEMIA: Status: RESOLVED | Noted: 2018-09-10 | Resolved: 2019-08-07

## 2019-08-07 PROBLEM — E43 SEVERE MALNUTRITION: Status: RESOLVED | Noted: 2017-09-02 | Resolved: 2019-08-07

## 2020-06-10 PROBLEM — B00.1 FEVER BLISTER: Status: ACTIVE | Noted: 2020-06-10

## 2021-01-22 ENCOUNTER — PATIENT MESSAGE (OUTPATIENT)
Dept: ADMINISTRATIVE | Facility: OTHER | Age: 76
End: 2021-01-22

## 2021-05-10 ENCOUNTER — PATIENT MESSAGE (OUTPATIENT)
Dept: RESEARCH | Facility: HOSPITAL | Age: 76
End: 2021-05-10

## 2021-07-22 ENCOUNTER — PATIENT MESSAGE (OUTPATIENT)
Dept: FAMILY MEDICINE | Facility: CLINIC | Age: 76
End: 2021-07-22

## 2022-09-30 PROBLEM — I49.9 DYSRHYTHMIA: Status: ACTIVE | Noted: 2022-09-30

## 2022-09-30 PROBLEM — R53.1 WEAKNESS: Status: ACTIVE | Noted: 2022-09-30

## 2022-09-30 PROBLEM — E16.2 HYPOGLYCEMIA: Status: ACTIVE | Noted: 2022-09-30

## 2024-03-09 ENCOUNTER — OFFICE VISIT (OUTPATIENT)
Dept: URGENT CARE | Facility: CLINIC | Age: 79
End: 2024-03-09
Payer: MEDICARE

## 2024-03-09 VITALS
TEMPERATURE: 98 F | HEIGHT: 60 IN | WEIGHT: 105 LBS | HEART RATE: 87 BPM | OXYGEN SATURATION: 98 % | SYSTOLIC BLOOD PRESSURE: 152 MMHG | RESPIRATION RATE: 20 BRPM | DIASTOLIC BLOOD PRESSURE: 75 MMHG | BODY MASS INDEX: 20.62 KG/M2

## 2024-03-09 DIAGNOSIS — R30.0 DYSURIA: Primary | ICD-10-CM

## 2024-03-09 LAB
BILIRUB UR QL STRIP: POSITIVE
GLUCOSE UR QL STRIP: NEGATIVE
KETONES UR QL STRIP: NEGATIVE
LEUKOCYTE ESTERASE UR QL STRIP: POSITIVE
PH, POC UA: 5 (ref 5–8)
POC BLOOD, URINE: POSITIVE
POC NITRATES, URINE: POSITIVE
PROT UR QL STRIP: NEGATIVE
SP GR UR STRIP: <=1.005 (ref 1–1.03)
UROBILINOGEN UR STRIP-ACNC: ABNORMAL (ref 0.1–1.1)

## 2024-03-09 PROCEDURE — 87088 URINE BACTERIA CULTURE: CPT | Performed by: NURSE PRACTITIONER

## 2024-03-09 PROCEDURE — 87077 CULTURE AEROBIC IDENTIFY: CPT | Performed by: NURSE PRACTITIONER

## 2024-03-09 PROCEDURE — 81003 URINALYSIS AUTO W/O SCOPE: CPT | Mod: QW,S$GLB,, | Performed by: NURSE PRACTITIONER

## 2024-03-09 PROCEDURE — 99213 OFFICE O/P EST LOW 20 MIN: CPT | Mod: S$GLB,,, | Performed by: NURSE PRACTITIONER

## 2024-03-09 PROCEDURE — 87086 URINE CULTURE/COLONY COUNT: CPT | Performed by: NURSE PRACTITIONER

## 2024-03-09 PROCEDURE — 87186 SC STD MICRODIL/AGAR DIL: CPT | Performed by: NURSE PRACTITIONER

## 2024-03-09 RX ORDER — NITROFURANTOIN 25; 75 MG/1; MG/1
100 CAPSULE ORAL 2 TIMES DAILY
Qty: 14 CAPSULE | Refills: 0 | Status: SHIPPED | OUTPATIENT
Start: 2024-03-09 | End: 2024-03-16

## 2024-03-09 RX ORDER — FLUCONAZOLE 150 MG/1
150 TABLET ORAL DAILY
Qty: 1 TABLET | Refills: 0 | Status: SHIPPED | OUTPATIENT
Start: 2024-03-09 | End: 2024-03-10

## 2024-03-09 NOTE — PROGRESS NOTES
Subjective:      Patient ID: Rosaline Skinner is a 78 y.o. female.    Vitals:  height is 5' (1.524 m) and weight is 47.6 kg (105 lb). Her oral temperature is 97.5 °F (36.4 °C). Her blood pressure is 152/75 (abnormal) and her pulse is 87. Her respiration is 20 and oxygen saturation is 98%.     Chief Complaint: Dysuria    Pt reports to clinic with dysuria, urinary urgency, urinary frequency, pelvic pain, and vaginal pain when sitting down onset today.  Pt is not using any treatment for symptoms at home. Pain rated 8 out of 10. Pt states that she has increased fluids and used a heat pad but felt no relief. She does have hx of recurrent UTI and sees urology specialist regularly.    Dysuria   This is a new problem. The current episode started acute onset. The problem occurs every urination. The problem has been unchanged. The quality of the pain is described as burning. The pain is at a severity of 8/10. The pain is severe. There has been no fever. The fever has been present for Less than 1 day. She is Sexually active. There is No history of pyelonephritis. Associated symptoms include frequency and urgency. She has tried increased fluids for the symptoms. The treatment provided no relief. Her past medical history is significant for recurrent UTIs.       Genitourinary:  Positive for dysuria, frequency and urgency.      Objective:     Physical Exam   Constitutional: She is oriented to person, place, and time. She appears well-developed. She is cooperative.  Non-toxic appearance. She does not appear ill. No distress.   HENT:   Head: Normocephalic and atraumatic.   Ears:   Right Ear: Hearing, tympanic membrane, external ear and ear canal normal.   Left Ear: Hearing, tympanic membrane, external ear and ear canal normal.   Nose: Nose normal. No mucosal edema, rhinorrhea or nasal deformity. No epistaxis. Right sinus exhibits no maxillary sinus tenderness and no frontal sinus tenderness. Left sinus exhibits no maxillary sinus  tenderness and no frontal sinus tenderness.   Mouth/Throat: Uvula is midline, oropharynx is clear and moist and mucous membranes are normal. No trismus in the jaw. Normal dentition. No uvula swelling. No oropharyngeal exudate, posterior oropharyngeal edema or posterior oropharyngeal erythema.   Eyes: Conjunctivae and lids are normal. No scleral icterus.   Neck: Trachea normal and phonation normal. Neck supple. No edema present. No erythema present. No neck rigidity present.   Cardiovascular: Normal rate, regular rhythm, normal heart sounds and normal pulses.   Pulmonary/Chest: Effort normal and breath sounds normal. No respiratory distress. She has no decreased breath sounds. She has no rhonchi.   Abdominal: Normal appearance. There is no left CVA tenderness and no right CVA tenderness.   Musculoskeletal: Normal range of motion.         General: No deformity. Normal range of motion.   Neurological: She is alert and oriented to person, place, and time. She exhibits normal muscle tone. Coordination normal.   Skin: Skin is warm, dry, intact, not diaphoretic and not pale.   Psychiatric: Her speech is normal and behavior is normal. Judgment and thought content normal.   Nursing note and vitals reviewed.      Assessment:     1. Dysuria        Plan:       Results for orders placed or performed in visit on 03/09/24   POCT Urinalysis, Dipstick, Automated, W/O Scope   Result Value Ref Range    POC Blood, Urine Positive (A) Negative    POC Bilirubin, Urine Positive (A) Negative    POC Urobilinogen, Urine norm 0.1 - 1.1    POC Ketones, Urine Negative Negative    POC Protein, Urine Negative Negative    POC Nitrates, Urine Positive (A) Negative    POC Glucose, Urine Negative Negative    pH, UA 5.0 5 - 8    POC Specific Gravity, Urine <=1.005 1.003 - 1.029    POC Leukocytes, Urine Positive (A) Negative     Patient's daughter is present with the patient.  Patient's daughter is a nurse practitioner.  Labs from Friday reviewed with  "the patient and caregiver.  Patient has an appointment with Oncology follow up on Tuesday.  Patient was placed on Macrobid due to issues with the previous Cipro.  Urine culture is sent to lab.  Patient will be contacted with results.  Precautions red flag warnings reviewed.  All questions answered.    Dysuria  -     POCT Urinalysis, Dipstick, Automated, W/O Scope  -     nitrofurantoin, macrocrystal-monohydrate, (MACROBID) 100 MG capsule; Take 1 capsule (100 mg total) by mouth 2 (two) times daily. for 7 days  Dispense: 14 capsule; Refill: 0  -     fluconazole (DIFLUCAN) 150 MG Tab; Take 1 tablet (150 mg total) by mouth once daily. for 1 day  Dispense: 1 tablet; Refill: 0  -     CULTURE, URINE        Patient Instructions   Bladder Infection, Female (Adult)    Urine is normally doesn't have any bacteria in it. But bacteria can get into the urinary tract from the skin around the rectum. Or they can travel in the blood from elsewhere in the body. Once they are in your urinary tract, they can cause infection in the urethra (urethritis), the bladder (cystitis), or the kidneys (pyelonephritis).  The most common place for an infection is in the bladder. This is called a bladder infection. This is one of the most common infections in women. Most bladder infections are easily treated. They are not serious unless the infection spreads to the kidney.  The phrases "bladder infection," "UTI," and "cystitis" are often used to describe the same thing. But they are not always the same. Cystitis is an inflammation of the bladder. The most common cause of cystitis is an infection.  Symptoms  The infection causes inflammation in the urethra and bladder. This causes many of the symptoms. The most common symptoms of a bladder infection are:  Pain or burning when urinating  Having to urinate more often than usual  Urgent need to urinate  Only a small amount of urine comes out  Blood in urine  Abdominal discomfort. This is usually in the " lower abdomen above the pubic bone.  Cloudy urine  Strong- or bad-smelling urine  Unable to urinate (urinary retention)  Unable to hold urine in (urinary incontinence)  Fever  Loss of appetite  Confusion (in older adults)    UTI Relief   - Increase water intake.  - Decrease sodas, tea, coffee and energy drinks until symptoms resolve.  - Cranberry products are thought to protect against UTI by inhibiting bacterial growth and adhesion to the bladder.  - Tylenol (acetaminophen) and/or NSAIDS (motrin, ibuprofen) as needed for discomfort.  - Timed voiding every 2-3 hours ( void every 2-3 hours even if you do not feel the urge).  - OTC AZO/pyridium if symptoms are persistent - this will turn your urine red/orange. This will help with symptomatic relief, but will not treat the infection.  - Avoid tight fitting cloths, douching, tampon use.  - Wipe front to back.   - Void prior to and after intercourse.   - Use probiotics especially with any antibiotic therapy.  Patient aware and verbalized understanding.      INSTRUCTIONS:  - Rest.  - Drink plenty of fluids.  - Take Tylenol and/or Ibuprofen as directed as needed for fever/pain.  Do not take more than the recommended dose.  - follow up with your PCP within the next 1-2 weeks as needed.  - You must understand that you have received an Urgent Care treatment only and that you may be released before all of your medical problems are known or treated.   - You, the patient, will arrange for follow up care as instructed.   - If your condition worsens or fails to improve we recommend that you receive another evaluation at the ER immediately or contact your PCP to discuss your concerns.   - You can call [phone] or [phone] to help schedule an appointment with the appropriate provider.     -If you smoke cigarettes, it would be beneficial for you to stop.

## 2024-03-09 NOTE — PATIENT INSTRUCTIONS
"Bladder Infection, Female (Adult)    Urine is normally doesn't have any bacteria in it. But bacteria can get into the urinary tract from the skin around the rectum. Or they can travel in the blood from elsewhere in the body. Once they are in your urinary tract, they can cause infection in the urethra (urethritis), the bladder (cystitis), or the kidneys (pyelonephritis).  The most common place for an infection is in the bladder. This is called a bladder infection. This is one of the most common infections in women. Most bladder infections are easily treated. They are not serious unless the infection spreads to the kidney.  The phrases "bladder infection," "UTI," and "cystitis" are often used to describe the same thing. But they are not always the same. Cystitis is an inflammation of the bladder. The most common cause of cystitis is an infection.  Symptoms  The infection causes inflammation in the urethra and bladder. This causes many of the symptoms. The most common symptoms of a bladder infection are:  Pain or burning when urinating  Having to urinate more often than usual  Urgent need to urinate  Only a small amount of urine comes out  Blood in urine  Abdominal discomfort. This is usually in the lower abdomen above the pubic bone.  Cloudy urine  Strong- or bad-smelling urine  Unable to urinate (urinary retention)  Unable to hold urine in (urinary incontinence)  Fever  Loss of appetite  Confusion (in older adults)    UTI Relief   - Increase water intake.  - Decrease sodas, tea, coffee and energy drinks until symptoms resolve.  - Cranberry products are thought to protect against UTI by inhibiting bacterial growth and adhesion to the bladder.  - Tylenol (acetaminophen) and/or NSAIDS (motrin, ibuprofen) as needed for discomfort.  - Timed voiding every 2-3 hours ( void every 2-3 hours even if you do not feel the urge).  - OTC AZO/pyridium if symptoms are persistent - this will turn your urine red/orange. This will help " with symptomatic relief, but will not treat the infection.  - Avoid tight fitting cloths, douching, tampon use.  - Wipe front to back.   - Void prior to and after intercourse.   - Use probiotics especially with any antibiotic therapy.  Patient aware and verbalized understanding.      INSTRUCTIONS:  - Rest.  - Drink plenty of fluids.  - Take Tylenol and/or Ibuprofen as directed as needed for fever/pain.  Do not take more than the recommended dose.  - follow up with your PCP within the next 1-2 weeks as needed.  - You must understand that you have received an Urgent Care treatment only and that you may be released before all of your medical problems are known or treated.   - You, the patient, will arrange for follow up care as instructed.   - If your condition worsens or fails to improve we recommend that you receive another evaluation at the ER immediately or contact your PCP to discuss your concerns.   - You can call [phone] or [phone] to help schedule an appointment with the appropriate provider.     -If you smoke cigarettes, it would be beneficial for you to stop.

## 2024-03-12 ENCOUNTER — TELEPHONE (OUTPATIENT)
Dept: URGENT CARE | Facility: CLINIC | Age: 79
End: 2024-03-12
Payer: MEDICARE

## 2024-03-12 DIAGNOSIS — R31.9 URINARY TRACT INFECTION WITH HEMATURIA, SITE UNSPECIFIED: Primary | ICD-10-CM

## 2024-03-12 DIAGNOSIS — N39.0 URINARY TRACT INFECTION WITH HEMATURIA, SITE UNSPECIFIED: Primary | ICD-10-CM

## 2024-03-12 LAB — BACTERIA UR CULT: ABNORMAL

## 2024-03-12 RX ORDER — GRANULES FOR ORAL 3 G/1
3 POWDER ORAL ONCE
Qty: 3 G | Refills: 0 | Status: SHIPPED | OUTPATIENT
Start: 2024-03-12 | End: 2024-03-12

## 2024-03-12 NOTE — TELEPHONE ENCOUNTER
Patient's daughter who is an authorized contact called to discuss results of urine culture which have just resulted.  Patient has Klebsiella bacteria that is resistant to all oral options.  She is taking Macrobid. Patient's daughter is a nurse practitioner and has contacted Dr. Velásquez's urology office to discuss the plan.  They have asked that we get in touch with them directly to discuss a possible treatment plan to see if she needs to be sent to the emergency department for IV antibiotics or if there is any sort of outpatient management that can be attempted.  Patient's daughter says that her mom is having less pain but is still otherwise symptomatic.  No fever, nausea or vomiting.  I have sent a message to Dr. Velásquez's office and we will document more when I hear back from them.

## 2024-03-12 NOTE — PROGRESS NOTES
Discussed culture results with patient's Urologist Dr. Velásquez with her consent. His recommendation based on culture review was for her to try 1 time fosfomycin 3 g p.o. x1.  They recommended that we send it to [facility] pharmacy as it can be difficult to find.  He has not in the office at this time so he is unable to e-prescribe but I did send it. Brenda at his office says they will follow up with patient tomorrow to be sure her infection resolves. If she should worsen, daughter Trinidad knows to take her to hospital ER.

## 2024-04-01 ENCOUNTER — OFFICE VISIT (OUTPATIENT)
Dept: OBSTETRICS AND GYNECOLOGY | Facility: CLINIC | Age: 79
End: 2024-04-01
Payer: MEDICARE

## 2024-04-01 VITALS — WEIGHT: 102.5 LBS | BODY MASS INDEX: 20.02 KG/M2 | SYSTOLIC BLOOD PRESSURE: 142 MMHG | DIASTOLIC BLOOD PRESSURE: 80 MMHG

## 2024-04-01 DIAGNOSIS — Z85.028 HISTORY OF GASTRIC CANCER: ICD-10-CM

## 2024-04-01 DIAGNOSIS — Z01.419 ENCOUNTER FOR ANNUAL ROUTINE GYNECOLOGICAL EXAMINATION: Primary | ICD-10-CM

## 2024-04-01 PROCEDURE — 99999 PR PBB SHADOW E&M-EST. PATIENT-LVL III: CPT | Mod: PBBFAC,,, | Performed by: OBSTETRICS & GYNECOLOGY

## 2024-04-01 PROCEDURE — G0101 CA SCREEN;PELVIC/BREAST EXAM: HCPCS | Mod: PBBFAC,PN | Performed by: OBSTETRICS & GYNECOLOGY

## 2024-04-01 PROCEDURE — 99213 OFFICE O/P EST LOW 20 MIN: CPT | Mod: PBBFAC,PN,25 | Performed by: OBSTETRICS & GYNECOLOGY

## 2024-04-01 PROCEDURE — G0101 CA SCREEN;PELVIC/BREAST EXAM: HCPCS | Mod: S$PBB,,, | Performed by: OBSTETRICS & GYNECOLOGY

## 2024-04-01 RX ORDER — ESTRADIOL 0.1 MG/G
1 CREAM VAGINAL DAILY
Qty: 42.5 G | Refills: 1 | Status: SHIPPED | OUTPATIENT
Start: 2024-04-01 | End: 2025-04-01

## 2024-04-01 NOTE — PROGRESS NOTES
"Chief Complaint   Patient presents with    Well Woman    Urinary Tract Infection     Has a picc line and being treated with antibiotics with Dr. Velásquez.       History and Physical:  No LMP recorded (lmp unknown). Patient has had a hysterectomy.       Rosaline Skinner is a 78 y.o. WFwho presents today for her routine annual GYN exam. The patient has no Gynecology complaints today. Rec. UTI      Allergies:   Review of patient's allergies indicates:   Allergen Reactions    Ciprofloxacin      Reports of developing C.diff after taking cipro, does not actually have an allergy to quinolones    Codeine      Other reaction(s): Not available    Sulfa (sulfonamide antibiotics) Hives     Other reaction(s): Not available    Tegaderm ag mesh [silver]      Other reaction(s): Not available    Ambien [zolpidem]      Sleep walking       Past Medical History:   Diagnosis Date    Allergy     DDD (degenerative disc disease), cervical     Glaucoma     Malignant neoplasm of fundus of stomach     Malnutrition of moderate degree 08/2017    MGD (meibomian gland disease)     Osteopenia     Pacemaker        Past Surgical History:   Procedure Laterality Date    ABDOMINAL SURGERY      ANKLE FRACTURE SURGERY Right     APPENDECTOMY      AUGMENTATION OF BREAST  1990    BLADDER SURGERY      "lifted bladder/anus and something else because it was prolapsed"    BOWEL RESECTION      BREAST CYST EXCISION Right     age 20/benign    BREAST SURGERY Bilateral     1990    CARDIAC PACEMAKER PLACEMENT      CATARACT EXTRACTION, BILATERAL      CHOLECYSTECTOMY      COLONOSCOPY  05/01/2012    Dr. Ordaz    feeding tube      GASTRECTOMY      total    HYSTERECTOMY  1981    Complete    INSERTION OF BREAST IMPLANT Bilateral     INSERTION OF MIDLINE CATHETER N/A 3/28/2024    Procedure: INSERTION, MIDLINE CATHETER;  Surgeon: PICC, SONY;  Location: [facility];  Service: Cardiology;  Laterality: N/A;  Dr Velásquez    JOINT REPLACEMENT      OOPHORECTOMY  1981    REVISION " TOTAL HIP ARTHROPLASTY Bilateral     SMALL INTESTINE SURGERY      TONSILLECTOMY      TOTAL HIP ARTHROPLASTY Bilateral        MEDS:   Current Outpatient Medications on File Prior to Visit   Medication Sig Dispense Refill    acetaminophen (TYLENOL) 325 MG tablet Take 2 tablets (650 mg total) by mouth every 4 (four) hours as needed.  0    ALPRAZolam (XANAX) 0.25 MG tablet Take 0.25 mg by mouth.      cyanocobalamin, vitamin B-12, 5,000 mcg/mL Drop Place 5,000 mcg under the tongue once daily.      desvenlafaxine succinate (PRISTIQ) 25 mg Tb24 Take 1 tablet (25 mg total) by mouth Daily. 90 tablet 0    dicyclomine (BENTYL) 10 MG capsule Take 10 mg by mouth.      Lactobacillus rhamnosus GG (CULTURELLE) 10 billion cell capsule Take 1 capsule by mouth once daily.      metoprolol succinate (TOPROL-XL) 25 MG 24 hr tablet Patient takes 1/2 tablet daily.      multivitamin Chew Take 1 capsule by mouth once daily.      sucralfate (CARAFATE) 1 gram tablet Take 1 g by mouth 2 (two) times daily.      tafluprost, PF, (ZIOPTAN, PF,) 0.0015 % Dpet Apply to eye.      timoloL maleate, PF, 0.5 % Dpet       traZODone (DESYREL) 100 MG tablet TAKE 1 TABLET BY MOUTH EVERY DAY AT NIGHT 90 tablet 1    valACYclovir (VALTREX) 500 MG tablet Take 1 tablet (500 mg total) by mouth 3 (three) times daily as needed. 30 tablet 2    vitamin D (VITAMIN D3) 1000 units Tab Take 1,000 Units by mouth once daily.       No current facility-administered medications on file prior to visit.       OB History          2    Para   2    Term   2            AB        Living             SAB        IAB        Ectopic        Multiple        Live Births                     Social History     Socioeconomic History    Marital status:    Tobacco Use    Smoking status: Never    Smokeless tobacco: Never   Substance and Sexual Activity    Alcohol use: Yes     Comment: ocassinoally     Drug use: No    Sexual activity: Yes     Partners: Male     Birth  control/protection: Post-menopausal       Family History   Problem Relation Age of Onset    Heart disease Father     Hyperlipidemia Father     Hypertension Father     Alzheimer's disease Mother     Diabetes Brother     No Known Problems Brother     Breast cancer Neg Hx     Colon cancer Neg Hx     Ovarian cancer Neg Hx     Uterine cancer Neg Hx     Cervical cancer Neg Hx          Past medical and surgical history reviewed.   I have reviewed the patient's medical history in detail and updated the computerized patient record.        Review of System:   General: no chills, fever, night sweats, weight gain or weight loss  Psychological: no depression or suicidal ideation  Breasts: no new or changing breast lumps, nipple discharge or masses.  Respiratory: no cough, shortness of breath, or wheezing  Cardiovascular: no chest pain or dyspnea on exertion  Gastrointestinal: no abdominal pain, change in bowel habits, or black or bloody stools  Genito-Urinary: no incontinence, urinary frequency/urgency or vulvar/vaginal symptoms, pelvic pain or abnormal vaginal bleeding.  Musculoskeletal: no gait disturbance or muscular weakness      Physical Exam:   BP (!) 142/80   Wt 46.5 kg (102 lb 8.2 oz)   LMP  (LMP Unknown) Comment: hysterectomy 1981  BMI 20.02 kg/m²   Constitutional: She is oriented to person, place, and time. She appears well-developed and well-nourished. No distress.  HENT:   Head: Normocephalic and atraumatic.   Eyes: Conjunctivae and EOM are normal. No scleral icterus.   Neck: Normal range of motion. Neck supple. No tracheal deviation present.   Cardiovascular: Normal rate.    Pulmonary/Chest: Effort normal. No respiratory distress. She exhibits no tenderness.  Breasts: are symmetrical.bilat implants   Right breast exhibits no inverted nipple, no mass, no nipple discharge, no skin change and no tenderness.   Left breast exhibits no inverted nipple, no mass, no nipple discharge, no skin change and no  tenderness.  Abdominal: Soft. She exhibits no distension and no mass. There is no tenderness. There is no rebound and no guarding.   Genitourinary:    External rectal exam shows no thrombosed external hemorrhoids.    Pelvic exam was performed with patient supine.   No labial fusion.   There is no rash, lesion or injury on the right labia.   There is no rash, lesion or injury on the left labia.   No bleeding and no signs of injury around the vaginal introitus, urethra is without lesions and well supported.    No vaginal discharge found. Scar from urethral lift palpated   No significant Cystocele, Enterocele or rectocele, and cuff well supported.   Bimanual exam:   The urethra and vagina are without palpable masses or tenderness.   Uterus and cervix are surgically absents, vaginal cuff is intact and well supported.   Right adnexum displays no mass and no tenderness.   Left adnexum displays no mass and no tenderness.  Musculoskeletal: Normal range of motion.   Lymphadenopathy: No inguinal adenopathy present.   Neurological: She is alert and oriented to person, place, and time. Coordination normal.   Skin: Skin is warm and dry. She is not diaphoretic.   Psychiatric: She has a normal mood and affect.        Assessment:   Normal annual GYN exam  1. Encounter for annual routine gynecological examination        2. History of gastric cancer          Rec UTI  Prev utrethral lift    Plan:   PAP  Not Needed  Mammogram  Zohaib desai finger tip qHs  Follow up in 1 year.

## 2024-06-04 PROBLEM — C16.9 MALIGNANT NEOPLASM OF STOMACH, UNSPECIFIED LOCATION: Status: ACTIVE | Noted: 2024-06-04

## 2024-06-04 PROBLEM — Z85.028: Status: ACTIVE | Noted: 2024-06-04

## 2024-06-04 PROBLEM — I70.0 AORTIC ATHEROSCLEROSIS: Status: ACTIVE | Noted: 2024-06-04

## 2024-06-06 ENCOUNTER — TELEPHONE (OUTPATIENT)
Dept: NEUROLOGY | Facility: CLINIC | Age: 79
End: 2024-06-06
Payer: MEDICARE

## 2024-06-06 NOTE — TELEPHONE ENCOUNTER
----- Message from Shekhar Mann sent at 2024  4:47 PM CDT -----  [facility] f/u TIA discharging [facility]   Contact: tiffany at [phone]  Type:  HOSP F/U Appointment Request    Caller is requesting a HOSP F/U appointment.      Name of Caller:  [facility] Scheduling    When is the first available appointment?  None in time frame needed.    Symptoms:  1w hosp f/u TIA discharging [facility]     Best Call Back Number:  [phone]    Additional Information:

## 2024-06-16 PROBLEM — I35.1 AORTIC VALVE REGURGITATION: Status: ACTIVE | Noted: 2024-02-26

## 2024-06-16 PROBLEM — R94.39 CARDIOVASCULAR STRESS TEST ABNORMAL: Status: ACTIVE | Noted: 2021-12-12

## 2024-06-16 PROBLEM — B00.1 FEVER BLISTER: Status: RESOLVED | Noted: 2020-06-10 | Resolved: 2024-06-16

## 2024-06-16 PROBLEM — R53.1 WEAKNESS: Status: RESOLVED | Noted: 2022-09-30 | Resolved: 2024-06-16

## 2024-06-16 PROBLEM — I47.10 SUPRAVENTRICULAR TACHYCARDIA: Status: ACTIVE | Noted: 2021-12-12

## 2024-06-19 ENCOUNTER — TELEPHONE (OUTPATIENT)
Dept: NEUROLOGY | Facility: CLINIC | Age: 79
End: 2024-06-19
Payer: MEDICARE

## 2024-06-19 NOTE — TELEPHONE ENCOUNTER
----- Message from Carleen Santos sent at 6/19/2024  2:35 PM CDT -----  Regarding: Sooner Appointment Request  Contact: patient at [phone]  Type:  Sooner Appointment Request      Name of Caller:  patient at [phone]    When is the first available appointment?  August  Symptoms:  hospital follow up    Additional Information:  Please call and advise. Thank you

## 2024-06-24 ENCOUNTER — OUTPATIENT CASE MANAGEMENT (OUTPATIENT)
Dept: ADMINISTRATIVE | Facility: OTHER | Age: 79
End: 2024-06-24
Payer: MEDICARE

## 2024-06-24 NOTE — PROGRESS NOTES
Outpatient Care Management  Patient Does Not Consent    Patient: Rosaline Skinner  MRN:  [id number]  Date of Service:  6/24/2024  Completed by:  Anusha Wong RN    Chief Complaint   Patient presents with    OPCM Enrollment Call    Case Closure       Patient Summary           Consent Received:  Decline  Decline Reason:  Needs met

## 2024-06-26 ENCOUNTER — OFFICE VISIT (OUTPATIENT)
Dept: NEUROLOGY | Facility: CLINIC | Age: 79
End: 2024-06-26
Payer: MEDICARE

## 2024-06-26 VITALS
DIASTOLIC BLOOD PRESSURE: 76 MMHG | RESPIRATION RATE: 14 BRPM | HEIGHT: 60 IN | SYSTOLIC BLOOD PRESSURE: 141 MMHG | HEART RATE: 79 BPM | WEIGHT: 103.31 LBS | BODY MASS INDEX: 20.28 KG/M2

## 2024-06-26 DIAGNOSIS — Z78.9 ALCOHOL USE: ICD-10-CM

## 2024-06-26 DIAGNOSIS — E53.8 B12 DEFICIENCY: Primary | ICD-10-CM

## 2024-06-26 DIAGNOSIS — Z90.3 HISTORY OF GASTRECTOMY: ICD-10-CM

## 2024-06-26 DIAGNOSIS — R27.0 ATAXIA, UNSPECIFIED: ICD-10-CM

## 2024-06-26 DIAGNOSIS — R79.89 ABNORMAL TSH: ICD-10-CM

## 2024-06-26 DIAGNOSIS — G45.9 TIA (TRANSIENT ISCHEMIC ATTACK): ICD-10-CM

## 2024-06-26 PROBLEM — F10.90 ALCOHOL USE: Status: ACTIVE | Noted: 2024-06-26

## 2024-06-26 PROCEDURE — 99214 OFFICE O/P EST MOD 30 MIN: CPT | Mod: PBBFAC,PO | Performed by: NURSE PRACTITIONER

## 2024-06-26 PROCEDURE — 99999 PR PBB SHADOW E&M-EST. PATIENT-LVL IV: CPT | Mod: PBBFAC,,, | Performed by: NURSE PRACTITIONER

## 2024-06-26 PROCEDURE — 99215 OFFICE O/P EST HI 40 MIN: CPT | Mod: S$PBB,,, | Performed by: NURSE PRACTITIONER

## 2024-06-26 NOTE — ASSESSMENT & PLAN NOTE
Diagnostic testing from recent hospitalization reviewed with patient and family  - MRI brain is acutely negative. Minimal chronic microangiopathy, normal age related findings.   - Vascular imaging negative for LVO or significant atherosclerotic disease   - No updated TTE done. Last on file from 2018 with pEF, LA dilation and negative bubble study. Established with Dr. Kim, reports having recent TTE at his office. Also has PPM in situ, no device information available for review. EKG shows A paced rhythm. She denies any hx of arrhythmia like Afib or prior DOAC use.   - A1C normal , LDL 96     She really does not have many vascular RF.      S/p DAPT protocol, to continue ASA monotherapy  Continue HI statin for goal LDL < 70. SI from Lipitor, to trial Crestor   BP goals reviewed  Dietary / lifestyle changes discussed - info on Mediterranean diet provided   Discussed ETOH moderation with pt and daughter -- updating some of her serologies today  CVA education / ED precautions discussed

## 2024-06-26 NOTE — PATIENT INSTRUCTIONS
"Continue the daily aspirin 81 mg     Try to new statin (rosuvastatin) as directed by Dr. Kim. The goal LDL cholesterol is < 70 -- yours was 96    BP goal is < 130/80    Continue to moderate your alcohol intake to 1-2 drinks daily   Alcohol before bedtime tends to interfere with sleep and can cause interrupted sleep    Will do some labs today to check your vitamin and thyroid levels     STROKE EDUCATION:    During a stroke, blood stops flowing to part of the brain. This can damage areas in the brain that control the rest of the body. Symptoms after a stroke depend on which part of the brain has been affected. A TIA is often called a "mini stroke" and can be a warning sign for future strokes.     Stroke Risk Factors:  - Previous stroke  - High blood pressure  - High cholesterol  - Cigarette/cigar smoking  - Diabetes  - Carotid or other artery disease  - Atrial fibrillation/flutter  - Obesity  - Blood clotting disorders  - Excessive alcohol use  - Street drug use  - Family history of stroke    Call 911 right away if you have any of the following symptoms of stroke:  Weakness, tingling, or loss of feeling on one side of your face or body  Sudden double vision or trouble seeing in one or both eyes  Sudden trouble talking or slurred speech  Trouble understanding others  Sudden, severe headache  Dizziness, loss of balance, or a sense of falling  Blackouts or seizures     F.A.S.T. is an easy way to remember the signs of stroke. When you see these signs, you know that you need to call 911 FAST!   F is for face drooping. One side of the face is drooping or numb. When the person smiles, the smile is uneven.   A is for arm weakness. One arm is weak or numb. When the person lifts both arms at the same time, one arm may drift downward.   S is for speech difficulty. You may notice slurred speech or trouble speaking. The person can't repeat a simple sentence correctly when asked.   T is for time to call 911. If someone shows " any of these symptoms, even if they go away, call 911 immediately. Make note of the time the symptoms first appeared.        Follow up with me as needed

## 2024-06-26 NOTE — PROGRESS NOTES
NEUROLOGY  Outpatient Visit     [facility]  [address] [facility], [place] [address]  [phone] (office) / [phone] (fax)    Patient Name:  Rosaline Skinner  :  1945  MR #:  [id number]  Acct #:  [id number]    Date of  Visit: 2024    Other Physicians:  Gayle Gaffney MD (Primary Care Physician)      CHIEF COMPLAINT: Transient Ischemic Attack (Hospital follow up / fam Hx of alz. )        HISTORY OF PRESENT ILLNESS:  Rosaline Skinner is a 78 y.o. R-handed female seen in hospital follow up for TIA per Gayle Gaffney MD    Medical history is significant for stomach carcinoma s/p gastrectomy, SSS s/p PPM, spinal DDD, glaucoma, osteopenia, SVT    New to me today. Evaluated by Dr. Puga during recent admission to [facility] 24 for episode of slurred speech, WFD. She was shaking all over. Called daughter, who came to check on her. Her gait was unsteady, but she didn't note any focal weakness or sensory loss. It seemed like the R side of her body was incoordinated. Seen by PCP urgently and then referred to the ED.  Symptoms lasted about 2 hours. MRI brain / CTA negative for acute stroke / LVO. No metabolic derangements identified to explain presentation.     No AP agents or statin at baseline. Treated with DAPT protocol and statin. Had some diarrhea for 2 weeks, so stopped Lipitor. Cardiologist prescribed rosuvastatin for her, she will  today.     Reports possible hx of TIA several years ago, not sure of symptoms. Reports history of unexplained syncope as well. Describes episodes when she would wake up on the floor having fallen, not knowing what happened. Several instances involved head trauma. Does not recall any UI or tongue bite. After an episode, no confusion or post ictal state that she recalls. She has had these episodes even after having her PPM implanted, although has not had one in quite some time. She is not aware of any history of seizures.     Denies any  personal or FH of VTE. Both her parents had CAD, dementia.     Recent increase in LFT trend. To follow up with GI next month.     Non smoker. Recent history of significant ETOH intake, some days up to a bottle of wine per daughter who lives with her. Quit drinking completely for the 21 days that she was taking Plavix, then has recently started limiting her intake again to 2 glasses of wine per day. Eating better, more whole foods.     No recurrent issues since discharge.     Allergies:  Review of patient's allergies indicates:   Allergen Reactions    Codeine      Other reaction(s): Not available    Tegaderm ag mesh [silver]      Other reaction(s): Not available    Ambien [zolpidem]      Sleep walking       Current Medications:  Current Outpatient Medications   Medication Sig Dispense Refill    ALPRAZolam (XANAX) 0.25 MG tablet Take 0.25 mg by mouth.      aspirin (ECOTRIN) 81 MG EC tablet Take 1 tablet (81 mg total) by mouth once daily. 30 tablet 0    desvenlafaxine succinate (PRISTIQ) 25 mg Tb24 TAKE 1 TABLET (25 MG TOTAL) BY MOUTH DAILY. 90 tablet 0    estradioL (ESTRACE) 0.01 % (0.1 mg/gram) vaginal cream Place 1 g vaginally once daily. 42.5 g 1    metoprolol succinate (TOPROL-XL) 25 MG 24 hr tablet 25 mg 2 (two) times daily.      sucralfate (CARAFATE) 1 gram tablet Take 1 g by mouth 2 (two) times daily.      tafluprost, PF, (ZIOPTAN, PF,) 0.0015 % Dpet Apply to eye.      timoloL maleate, PF, 0.5 % Dpet       traZODone (DESYREL) 100 MG tablet TAKE 1 TABLET BY MOUTH EVERY DAY AT NIGHT 90 tablet 1     No current facility-administered medications for this visit.       Past Medical History:  Past Medical History:   Diagnosis Date    Allergy     DDD (degenerative disc disease), cervical     Glaucoma     Malignant neoplasm of fundus of stomach     Malnutrition of moderate degree 08/2017    MGD (meibomian gland disease)     Osteopenia     Pacemaker        Past Surgical History:  Past Surgical History:   Procedure  "Laterality Date    ABDOMINAL SURGERY      ANKLE FRACTURE SURGERY Right     APPENDECTOMY      AUGMENTATION OF BREAST  1990    BLADDER SURGERY      "lifted bladder/anus and something else because it was prolapsed"    BOWEL RESECTION      BREAST CYST EXCISION Right     age 20/benign    BREAST SURGERY Bilateral     1990    CARDIAC PACEMAKER PLACEMENT      CATARACT EXTRACTION, BILATERAL      CHOLECYSTECTOMY      COLONOSCOPY  05/01/2012    Dr. Ordaz    feeding tube      GASTRECTOMY      total    HYSTERECTOMY  1981    Complete    INSERTION OF BREAST IMPLANT Bilateral     INSERTION OF MIDLINE CATHETER N/A 3/28/2024    Procedure: INSERTION, MIDLINE CATHETER;  Surgeon: PICC, SONY;  Location: [facility];  Service: Cardiology;  Laterality: N/A;  Dr Velásquez    JOINT REPLACEMENT      OOPHORECTOMY  1981    PERIPHERALLY INSERTED CENTRAL CATHETER INSERTION N/A 4/1/2024    Procedure: INSERTION, PICC;  Surgeon: PICC, SONY;  Location: [facility];  Service: Cardiology;  Laterality: N/A;    REVISION TOTAL HIP ARTHROPLASTY Bilateral     SMALL INTESTINE SURGERY      TONSILLECTOMY      TOTAL HIP ARTHROPLASTY Bilateral        Family History:  family history includes Alzheimer's disease in her brother, mother, and paternal grandmother; Diabetes in her brother; Heart disease in her father; Hyperlipidemia in her father; Hypertension in her father; No Known Problems in her brother.    Social History:   reports that she has never smoked. She has never used smokeless tobacco. She reports current alcohol use. She reports that she does not use drugs.      REVIEW OF SYSTEMS:  As per HPI    PHYSICAL EXAM:  BP (!) 141/76 (BP Location: Left arm, Patient Position: Sitting, BP Method: Medium (Automatic))   Pulse 79   Resp 14   Ht 5' (1.524 m)   Wt 46.9 kg (103 lb 4.6 oz)   LMP  (LMP Unknown) Comment: hysterectomy 1981  BMI 20.17 kg/m²     General: Well groomed. No acute distress.  Cardiovascular: Regular rate and rhythm.   Pulmonary: Normal effort " and rate.   Musculoskeletal: No obvious joint deformities, moves all extremities well.  Extremities: No clubbing, cyanosis or edema.     Neurological exam:  Mental status: Awake and alert.  Oriented to person, place, time and situation. Recent and remote memory appear to be intact.  Fund of knowledge normal. Normal attention and concentration.   Speech/Language: Fluent and appropriate. No dysarthria or aphasia on conversation. Able to follow complex commands.   Cranial nerves (II-XII): Visual fields full. Pupils equal round and reactive to light, extraocular movements intact, no ptosis, no nystagmus. Facial sensation and symmetry intact bilaterally. Hearing grossly intact. Palate mobile and midline. Shoulder shrug normal bilaterally. Normal tongue protrusion.   Motor: 5 out of 5 strength throughout the upper and lower extremities bilaterally. Normal bulk and tone. No abnormal movements noted. No drift appreciated.   Sensation: Intact to light touch and temperature bilaterally.   DTR: 2+ at the knees and biceps bilaterally.  Coordination: Finger-nose-finger testing intact bilaterally. MELISSA normal bilaterally. No tremor.   Gait: Normal gait.    DIAGNOSTIC DATA:  I have personally reviewed provider notes, labs and imaging made available to me today.     Imaging:  CTA head and neck  FINDINGS:  The vertebral arteries appear code dominant.  No flow-limiting stenosis, aneurysm, dissection, or occlusion identified vertebral arteries.  The common iliac arteries, carotid bulbs, cervical internal carotid arteries, intracranial internal carotid arteries, anterior cerebral arteries, posterior cerebral arteries, and basilar artery demonstrate no flow-limiting stenosis, aneurysm, dissection.  On delayed imaging the dural venous sinuses demonstrate no thrombosis.  Included lung apices demonstrate biapical pleuroparenchymal scarring.     Impression:     1. No flow-limiting stenosis, aneurysm, or dissection identified.    MRI brain wo  6/5/24:  FINDINGS:  There is normal brain formation.  There is no hemorrhage, hydrocephalus, or midline shift.  There is no restricted diffusion.  There is very minimal periventricular FLAIR hyperintensity.  There is no intra or extra-axial fluid collection.  There is no herniation.     There are normal flow voids in the bilateral carotid siphons.  The sella is normal.  The cerebellar tonsils are normally position.  There is normal signal in the included calvarium.  The bilateral orbits are normal.  The paranasal sinuses are free of disease.     Impression:     No acute intracranial abnormality.     Cardiac:  EKG 6/2024  Atrial-paced rhythm with prolonged AV conduction     TTE 9/2018:  Left ventricle ejection fraction is normal at 60%  Grade II (moderate) left ventricular diastolic dysfunction consistent with pseudonormalization.  LA pressure is elevated.  RV systolic function is normal.  Left atrium is mildly dilated.  Right atrium is mildly dilated.  Tricuspid valve shows moderate regurgitation.  Mitral valve shows mild-to-moderate regurgitation.  Bubble study negative       Labs:  Lab Results   Component Value Date    WBC 4.69 06/05/2024    HGB 12.8 06/05/2024    HCT 37.8 06/05/2024     06/05/2024    MCV 95 06/05/2024    RDW 12.7 06/05/2024     Lab Results   Component Value Date     (L) 06/05/2024    K 4.2 06/05/2024    CL 97 06/05/2024    CO2 27 06/05/2024    BUN 12 06/05/2024    CREATININE 0.67 06/05/2024     (H) 06/05/2024    CALCIUM 8.5 06/05/2024    MG 1.8 06/04/2024    PHOS 3.2 06/05/2024     Lab Results   Component Value Date    PROT 8.2 06/04/2024    ALBUMIN 4.7 06/04/2024    BILITOT 1.2 06/04/2024    AST 87 (H) 06/04/2024    ALKPHOS 70 06/04/2024    ALT 58 (H) 06/04/2024     Lab Results   Component Value Date    INR 1.0 06/04/2024     Lab Results   Component Value Date    CHOL 215 (H) 06/04/2024    HDL 99 (H) 06/04/2024    LDLCALC 96.2 06/04/2024    TRIG 99 06/04/2024    CHOLHDL 46.0  06/04/2024     Lab Results   Component Value Date    HGBA1C 5.2 06/04/2024      Lab Results   Component Value Date    TZELSDRQ15 >1000 (H) 10/06/2022     Lab Results   Component Value Date    FOLATE 18.6 09/13/2016     Lab Results   Component Value Date    TSH 4.910 (H) 06/04/2024       ASSESSMENT & PLAN:  Rosaline Skinner is a 78 y.o. R-handed female seen in hospital follow up for TIA.     Problem List Items Addressed This Visit          Neuro    TIA (transient ischemic attack)    Overview     9/2018, similar symptoms with recent episode 6/2024         Current Assessment & Plan     Diagnostic testing from recent hospitalization reviewed with patient and family  - MRI brain is acutely negative. Minimal chronic microangiopathy, normal age related findings.   - Vascular imaging negative for LVO or significant atherosclerotic disease   - No updated TTE done. Last on file from 2018 with pEF, LA dilation and negative bubble study. Established with Dr. Kim, reports having recent TTE at his office. Also has PPM in situ, no device information available for review. EKG shows A paced rhythm. She denies any hx of arrhythmia like Afib or prior DOAC use.   - A1C normal , LDL 96     She really does not have many vascular RF.      S/p DAPT protocol, to continue ASA monotherapy  Continue HI statin for goal LDL < 70. SI from Lipitor, to trial Crestor   BP goals reviewed  Dietary / lifestyle changes discussed - info on Mediterranean diet provided   Discussed ETOH moderation with pt and daughter -- updating some of her serologies today  CVA education / ED precautions discussed                  Psychiatric    Alcohol use     Other Visit Diagnoses       B12 deficiency    -  Primary    History of gastrectomy        Abnormal TSH        Ataxia, unspecified                Follow up: PRN    I spent a total of 42 minutes on the day of the visit.    This includes face to face time with the patient, as well as non-face to face time  preparing for and completing the visit (review of prior diagnostic testing and clinical notes, obtaining or reviewing history, documenting clinical information in the EMR, independently interpreting and communicating results to the patient/family and coordinating ongoing care).       I appreciate the opportunity to participate in the care of this patient. Please feel free to contact me with any concerns or questions.       Aleah Elias, ACN-AG  [facility] [address] [place] [address]

## 2024-09-05 ENCOUNTER — TELEPHONE (OUTPATIENT)
Dept: OPHTHALMOLOGY | Facility: CLINIC | Age: 79
End: 2024-09-05
Payer: MEDICARE

## 2024-09-05 NOTE — TELEPHONE ENCOUNTER
----- Message from Yumi Hahn MA sent at 9/5/2024  4:53 PM CDT -----  Regarding: FW: Pt's Elder Combs called to schedule a new pt appt for the pt for glaucoma and she would like a call back today    ----- Message -----  From: Haley Grant  Sent: 9/5/2024   1:57 PM CDT  To: Makenzie Mccall Staff  Subject: Pt's Elder Combs called to schedul#    Type:  Appointment Request    Name of Caller: Pt's Elder Combs called to schedule a new pt appt for the pt for glaucoma and she would like a call back today  When is the first available appointment? none  Symptoms: glaucoma   Best Call Back Number:[phone] Ms Trinidad Combs

## 2024-09-05 NOTE — TELEPHONE ENCOUNTER
Pt's daughter declined apt that was offered to her.   She will call back if she changes her mind

## 2024-09-30 PROBLEM — G45.9 TIA (TRANSIENT ISCHEMIC ATTACK): Status: RESOLVED | Noted: 2018-09-10 | Resolved: 2024-09-30

## 2024-10-04 ENCOUNTER — CLINICAL SUPPORT (OUTPATIENT)
Dept: OPHTHALMOLOGY | Facility: CLINIC | Age: 79
End: 2024-10-04
Payer: MEDICARE

## 2024-10-04 ENCOUNTER — OFFICE VISIT (OUTPATIENT)
Dept: OPHTHALMOLOGY | Facility: CLINIC | Age: 79
End: 2024-10-04
Payer: MEDICARE

## 2024-10-04 DIAGNOSIS — H40.1123 PRIMARY OPEN ANGLE GLAUCOMA (POAG) OF LEFT EYE, SEVERE STAGE: ICD-10-CM

## 2024-10-04 DIAGNOSIS — H40.1113 PRIMARY OPEN ANGLE GLAUCOMA (POAG) OF RIGHT EYE, SEVERE STAGE: Primary | ICD-10-CM

## 2024-10-04 DIAGNOSIS — Z96.1 PSEUDOPHAKIA OF BOTH EYES: ICD-10-CM

## 2024-10-04 DIAGNOSIS — N30.01 ACUTE CYSTITIS WITH HEMATURIA: ICD-10-CM

## 2024-10-04 PROCEDURE — 99999 PR PBB SHADOW E&M-EST. PATIENT-LVL III: CPT | Mod: PBBFAC,,, | Performed by: STUDENT IN AN ORGANIZED HEALTH CARE EDUCATION/TRAINING PROGRAM

## 2024-10-04 PROCEDURE — 99213 OFFICE O/P EST LOW 20 MIN: CPT | Mod: PBBFAC | Performed by: STUDENT IN AN ORGANIZED HEALTH CARE EDUCATION/TRAINING PROGRAM

## 2024-10-04 RX ORDER — DORZOLAMIDE HYDROCHLORIDE AND TIMOLOL MALEATE 20; 5 MG/ML; MG/ML
1 SOLUTION/ DROPS OPHTHALMIC 2 TIMES DAILY
COMMUNITY
End: 2024-10-04 | Stop reason: SDUPTHER

## 2024-10-04 RX ORDER — DORZOLAMIDE HYDROCHLORIDE AND TIMOLOL MALEATE 20; 5 MG/ML; MG/ML
1 SOLUTION/ DROPS OPHTHALMIC 2 TIMES DAILY
Qty: 10 ML | Refills: 6 | Status: SHIPPED | OUTPATIENT
Start: 2024-10-04

## 2024-10-04 NOTE — PROGRESS NOTES
"Subjective:  HPI    Chief complaint: 78 y.o. female presents for glaucoma evaluation. Patient   was seeing Dr. Kellogg but wanted 2nd opinion. Patient states OD is her   "bad eye," has worsened over the last 2 years. Has trouble with depth   perception. Has trouble with gait due to visual distortion. Denies eye   pain, headaches, flashes, and floaters. Patient states she has had two   TIAs.     Past medical history?    Stomach cancer   Pacemaker   Past ocular history?    Pseudophakia OU   Glaucoma     Glaucoma history:  Diagnosed with glaucoma when? About 20 years   Hx eye surgery? CEIOL OU about 7 years ago   Hx eye lasers? none  History of low blood pressure? No   History of migraines? H/o migraines in the 80s-90s, have resolved   History of blunt trauma to eye? none  History of steroid use? Patient was on PO steroids for "bug bites" 20   years ago  Family history of glaucoma? Father- glaucoma   What is the highest your eye pressure has been? 20s before starting drops     Eye drops?   Zioptan PF qhs OU (every other day)  Timolol BID OU   Last edited by Elda Casarez on 10/4/2024 11:32 AM.        Exam:  See encounter report for full exam    Assessment:  1. Primary open angle glaucoma (POAG) of right eye, severe stage  2. Primary open angle glaucoma (POAG) of left eye, severe stage  RAPD OD  - Referral from: self. Establishing me 10/2024.  - PMH: hx TIA, mild depression, chronic imsomnia, hx alcohol abuse  - Surg hx: phaco/IOL OU   - Laser hx: none  - Glaucoma FHx: father  -  / 598  - Gonio: SS/pseudophakic ([place] 10/2024)  - Tmax: 20s before starting drops  - Target IOP: <13 (severity)  - Med adverse effects: n/a    Baseline HVF 10/2024 0/12  Baseline RNFL 10/2024 30/63  Baseline photos pending    10/04/2024  IOP 20/16 likely above target OD>OS  HVF: reliable, extinguished, VFI 0 OD  poorly reliable, SAD/IAD with SN clearing, VFI 12 OS -- baseline  OCT RNFL: diffuse thinning, avg 30 OD  ST/IT thinning, " avg 63 OS -- baseline, floor OD  RAPD OD    3. Pseudophakia of both eyes  MFIOL  Good lens position  Monitor    Plan:  Likely needs <13 OD, low teen OS. Discussed possible Xen sx in the future.  - Change Gianluca to Cosopt 2/2  - Change to Zioptan QHS every day (was taking every other day)  - [facility] referral  - Dr. Kellogg records release    Today's visit is associated with current and anticipated ongoing medical care related to this patient's single serious/complex condition (glaucoma). Follow up is to be continued indefinitely to monitor the condition.    Return 6 weeks -- VA, IOP, Dilate, Optos/Stereo on way out    Makenzie Mccall MD  [facility]

## 2024-10-10 ENCOUNTER — TELEPHONE (OUTPATIENT)
Dept: OPHTHALMOLOGY | Facility: CLINIC | Age: 79
End: 2024-10-10
Payer: MEDICARE

## 2024-10-10 NOTE — TELEPHONE ENCOUNTER
Add High Risk Medication Management Associated Diagnosis?: No Medical records received for Dr. Mccall from Dr. Darwin Kellogg's office, sent to be scanned into patient's chart.    Dr. Darwin Kellogg  [facility]  [id number] [place] [address] [place] [address]  Office: [phone]  FAX: [phone]  After Hours: [phone]   Xerosis Normal Treatment: I recommended application of Cetaphil or CeraVe numerous times a day going to bed to all dry areas. Months Of Therapy Completed: 2 Headache Monitoring: I recommended monitoring the headaches for now. There is no evidence of increased intracranial pressure. They were instructed to call if the headaches are worsening. Cheilitis Normal Treatment: I recommended application of Vaseline or Aquaphor numerous times a day (as often as every hour) and before going to bed. Next Month's Dosage: 30mg BID Xerosis Aggressive Treatment: I recommended application of Cetaphil or CeraVe numerous times a day going to bed to all dry areas. I also prescribed a topical steroid for twice daily use. Retinoid Dermatitis Normal Treatment: I recommended more frequent application of Cetaphil or CeraVe to the areas of dermatitis. Nosebleeds Normal Treatment: I explained this is common when taking isotretinoin. I recommended saline mist in each nostril multiple times a day. If this worsens they will contact us. Detail Level: Zone Hypercholesterolemia Monitoring: I explained this is common when taking isotretinoin. We will monitor closely. Counseling Text: I reviewed the side effect in detail. Patient should get monthly blood tests, not donate blood, not drive at night if vision affected, and not share medication. Cheilitis Aggressive Treatment: I recommended application of Vaseline or Aquaphor numerous times a day (as often as every hour) and before going to bed. I also prescribed a topical steroid for twice daily use. Dosing Month 1 (Required For Cumulative Dosing): 40mg Daily Myalgia Monitoring: I explained this is common when taking isotretinoin. If this worsens they will contact us. Female Pregnancy Counseling Text: Female patients should also be on two forms of birth control while taking this medication and for one month after their last dose. Retinoid Dermatitis Aggressive Treatment: I recommended more frequent application of Cetaphil or CeraVe to the areas of dermatitis. I also prescribed a topical steroid for twice daily use until the dermatitis resolves. Myalgia Treatment: I explained this is common when taking isotretinoin. If this worsens they will contact us. They may try OTC ibuprofen. What Is The Patient's Gender: Female Pounds Preamble Statement (Weight Entered In Details Tab): Reported Weight in pounds: Use Therapeutic Ranged Or Therapeutic Target: please select Range or Target Female Completion Statement: After discussing her treatment course we decided to discontinue isotretinoin therapy at this time. I explained that she would need to continue her birth control methods for at least one month after the last dosage. She should also get a pregnancy test one month after the last dose. She shouldn't donate blood for one month after the last dose. She should call with any new symptoms of depression. Lower Range (In Mg/Kg): 120 Xerosis Normal Treatment: I recommended application of Cetaphil or CeraVe numerous times a day and before going to bed to all dry areas. Kilograms Preamble Statement (Weight Entered In Details Tab): Reported Weight in kilograms: Upper Range (In Mg/Kg): 150 Xerosis Aggressive Treatment: I recommended application of Cetaphil or CeraVe numerous times a day and before going to bed to all dry areas. I also prescribed a topical steroid for twice daily use. Target Cumulative Dosage (In Mg/Kg): 135 Are Labs Available For Review?: Yes Male Completion Statement: After discussing his treatment course we decided to discontinue isotretinoin therapy at this time. He shouldn't donate blood for one month after the last dose. He should call with any new symptoms of depression. Weight Units: pounds

## 2024-12-06 PROBLEM — F10.90 ALCOHOL USE: Status: RESOLVED | Noted: 2024-06-26 | Resolved: 2024-12-06

## 2024-12-06 PROBLEM — F10.11 ALCOHOL ABUSE, IN REMISSION: Status: ACTIVE | Noted: 2024-12-06

## 2024-12-06 PROBLEM — Z78.9 ALCOHOL USE: Status: RESOLVED | Noted: 2024-06-26 | Resolved: 2024-12-06

## 2024-12-11 ENCOUNTER — OFFICE VISIT (OUTPATIENT)
Dept: OPHTHALMOLOGY | Facility: CLINIC | Age: 79
End: 2024-12-11
Payer: MEDICARE

## 2024-12-11 DIAGNOSIS — H40.1123 PRIMARY OPEN ANGLE GLAUCOMA (POAG) OF LEFT EYE, SEVERE STAGE: ICD-10-CM

## 2024-12-11 DIAGNOSIS — Z96.1 PSEUDOPHAKIA OF BOTH EYES: ICD-10-CM

## 2024-12-11 DIAGNOSIS — H40.1113 PRIMARY OPEN ANGLE GLAUCOMA (POAG) OF RIGHT EYE, SEVERE STAGE: Primary | ICD-10-CM

## 2024-12-11 PROCEDURE — G2211 COMPLEX E/M VISIT ADD ON: HCPCS | Mod: S$PBB,,, | Performed by: STUDENT IN AN ORGANIZED HEALTH CARE EDUCATION/TRAINING PROGRAM

## 2024-12-11 PROCEDURE — 92250 FUNDUS PHOTOGRAPHY W/I&R: CPT | Mod: PBBFAC | Performed by: STUDENT IN AN ORGANIZED HEALTH CARE EDUCATION/TRAINING PROGRAM

## 2024-12-11 PROCEDURE — 99214 OFFICE O/P EST MOD 30 MIN: CPT | Mod: S$PBB,,, | Performed by: STUDENT IN AN ORGANIZED HEALTH CARE EDUCATION/TRAINING PROGRAM

## 2024-12-11 PROCEDURE — 99213 OFFICE O/P EST LOW 20 MIN: CPT | Mod: PBBFAC,25 | Performed by: STUDENT IN AN ORGANIZED HEALTH CARE EDUCATION/TRAINING PROGRAM

## 2024-12-11 PROCEDURE — 99999 PR PBB SHADOW E&M-EST. PATIENT-LVL III: CPT | Mod: PBBFAC,,, | Performed by: STUDENT IN AN ORGANIZED HEALTH CARE EDUCATION/TRAINING PROGRAM

## 2024-12-11 NOTE — PROGRESS NOTES
Subjective:  HPI    DLS: 10/4/24 w/ Dr. Mccall    79 y.o. female presents for IOP Check w/ DFE, optos. Patient denies   changes to VA, flashes, floaters, and headaches. States OD was sore 1 week   ago, possible sub conj heme.    Meds:  Timolol BID OU  Zioptan 3x weekly OD  Last edited by Elda Casarez on 12/11/2024  1:27 PM.        Exam:  See encounter report for full exam    Assessment:  1. Primary open angle glaucoma (POAG) of right eye, severe stage  2. Primary open angle glaucoma (POAG) of left eye, severe stage  RAPD OD  - Referral from: self. Establishing me 10/2024.  - PMH: hx TIA, mild depression, chronic imsomnia, hx alcohol abuse  - Surg hx: phaco/IOL OU   - Laser hx: none  - Glaucoma FHx: father  -  / 598   - Gonio: SS/pseudophakic (Stefany 10/2024)  - Tmax: 20s before starting drops  - Target IOP: <13 (severity)  - Med adverse effects: n/a    Baseline HVF 10/2024 0/12  Baseline RNFL 10/2024 30/63  Baseline photos 12/2024    HVF: reliable, extinguished, VFI 0 OD  poorly reliable, SAD/IAD with SN clearing, VFI 12 OS -- baseline  OCT RNFL: diffuse thinning, avg 30 OD  ST/IT thinning, avg 63 OS -- baseline, floor OD    12/11/2024  IOP improved but still not at goal OD  Baseline photos today    3. Pseudophakia of both eyes  MFIOL  Good lens position  Monitor    Plan:  IOP improved but never picked up Cosopt. Discussed possible Xen sx in the future.  - Change Gianluca to Cosopt 2/2  - Continue Zioptan [facility]   - [facility] referral  - Dr. Kellogg records release    Today's visit is associated with current and anticipated ongoing medical care related to this patient's single serious/complex condition (glaucoma). Follow up is to be continued indefinitely to monitor the condition.    Return 2 months -- HVF, VA, IOP  Next DFE due 12/2025    Makenzie Mccall MD  [facility]

## 2024-12-18 DIAGNOSIS — H40.1113 PRIMARY OPEN ANGLE GLAUCOMA (POAG) OF RIGHT EYE, SEVERE STAGE: ICD-10-CM

## 2024-12-18 DIAGNOSIS — H40.1113 PRIMARY OPEN ANGLE GLAUCOMA (POAG) OF RIGHT EYE, SEVERE STAGE: Primary | ICD-10-CM

## 2024-12-18 DIAGNOSIS — H40.1123 PRIMARY OPEN ANGLE GLAUCOMA (POAG) OF LEFT EYE, SEVERE STAGE: ICD-10-CM

## 2024-12-18 RX ORDER — TAFLUPROST OPTHALMIC 0 MG/.3ML
1 SOLUTION/ DROPS OPHTHALMIC NIGHTLY
Qty: 30 EACH | Refills: 6 | Status: SHIPPED | OUTPATIENT
Start: 2024-12-18

## 2024-12-18 RX ORDER — BIMATOPROST 0.1 MG/ML
SOLUTION/ DROPS OPHTHALMIC
Refills: 0 | OUTPATIENT
Start: 2024-12-18

## 2024-12-18 NOTE — TELEPHONE ENCOUNTER
----- Message from [employer] sent at 12/18/2024  8:41 AM CST -----  Regarding: rx refill  Contact: Trinidad daughter  RX Name: Zioptam          How is it taken:     Quantity:       Preferred Pharmacy with phone number:  [id number] - [place] - [address]  [address]  [place] [address]  Phone: [phone] Fax: [phone]            Ordering Provider:Stefany       Contact Preference:[phone] (home)       Addl info:

## 2024-12-26 PROBLEM — Z95.0 HISTORY OF CARDIAC PACEMAKER: Status: ACTIVE | Noted: 2024-12-26

## 2024-12-26 PROBLEM — H40.1123 PRIMARY OPEN ANGLE GLAUCOMA (POAG) OF LEFT EYE, SEVERE STAGE: Status: ACTIVE | Noted: 2024-12-26

## 2024-12-26 PROBLEM — F32.A MILD DEPRESSION: Status: ACTIVE | Noted: 2024-12-26

## 2024-12-26 PROBLEM — H40.1113 PRIMARY OPEN ANGLE GLAUCOMA (POAG) OF RIGHT EYE, SEVERE STAGE: Status: ACTIVE | Noted: 2024-12-26

## 2024-12-26 PROBLEM — E16.2 HYPOGLYCEMIA: Status: RESOLVED | Noted: 2022-09-30 | Resolved: 2024-12-26

## 2024-12-26 PROBLEM — Z96.1 PSEUDOPHAKIA OF BOTH EYES: Status: ACTIVE | Noted: 2024-12-26

## 2024-12-26 PROBLEM — F51.01 PRIMARY INSOMNIA: Status: ACTIVE | Noted: 2024-12-26

## 2025-02-19 ENCOUNTER — OFFICE VISIT (OUTPATIENT)
Dept: OPHTHALMOLOGY | Facility: CLINIC | Age: 80
End: 2025-02-19
Payer: MEDICARE

## 2025-02-19 ENCOUNTER — CLINICAL SUPPORT (OUTPATIENT)
Dept: OPHTHALMOLOGY | Facility: CLINIC | Age: 80
End: 2025-02-19
Payer: MEDICARE

## 2025-02-19 DIAGNOSIS — H40.1123 PRIMARY OPEN ANGLE GLAUCOMA (POAG) OF LEFT EYE, SEVERE STAGE: ICD-10-CM

## 2025-02-19 DIAGNOSIS — Z96.1 PSEUDOPHAKIA OF BOTH EYES: ICD-10-CM

## 2025-02-19 DIAGNOSIS — H40.1113 PRIMARY OPEN ANGLE GLAUCOMA (POAG) OF RIGHT EYE, SEVERE STAGE: Primary | ICD-10-CM

## 2025-02-19 PROCEDURE — 99212 OFFICE O/P EST SF 10 MIN: CPT | Mod: PBBFAC | Performed by: STUDENT IN AN ORGANIZED HEALTH CARE EDUCATION/TRAINING PROGRAM

## 2025-02-19 PROCEDURE — 92083 EXTENDED VISUAL FIELD XM: CPT | Mod: PBBFAC | Performed by: STUDENT IN AN ORGANIZED HEALTH CARE EDUCATION/TRAINING PROGRAM

## 2025-02-19 PROCEDURE — G2211 COMPLEX E/M VISIT ADD ON: HCPCS | Mod: S$PBB,,, | Performed by: STUDENT IN AN ORGANIZED HEALTH CARE EDUCATION/TRAINING PROGRAM

## 2025-02-19 PROCEDURE — 99214 OFFICE O/P EST MOD 30 MIN: CPT | Mod: S$PBB,,, | Performed by: STUDENT IN AN ORGANIZED HEALTH CARE EDUCATION/TRAINING PROGRAM

## 2025-02-19 NOTE — PROGRESS NOTES
Subjective:  HPI    DLS: 12/11/2024  Drop change at last visit  HVF/IOP/OCT-Mac  VA down OD    Cosopt BID OU   Zioptan Q HS OU   POAG   PC IOL OU     Last edited by Yumi Hahn MA on 2/19/2025  2:28 PM.        Exam:  See encounter report for full exam    Assessment:  1. Primary open angle glaucoma (POAG) of right eye, severe stage  2. Primary open angle glaucoma (POAG) of left eye, severe stage  RAPD OD  - Referral from: self. Establishing me 10/2024.  - PMH: hx TIA, mild depression, chronic imsomnia, hx alcohol abuse  - Surg hx: phaco/IOL OU   - Laser hx: none  - Glaucoma FHx: father  -  / 598   - Gonio: SS/pseudophakic (Stefany 10/2024)  - Tmax: 20s before starting drops  - Target IOP: <13 (severity)  - Med adverse effects: n/a    Baseline HVF 10/2024 0/12  Baseline RNFL 10/2024 30/63  Baseline photos 12/2024    HVF: reliable, extinguished, VFI 0 OD  poorly reliable, SAD/IAD with SN clearing, VFI 12 OS -- baseline  OCT RNFL: diffuse thinning, avg 30 OD  ST/IT thinning, avg 63 OS -- baseline, floor OD    02/19/2025  IOP improved but still not at goal OD  HVF: reliable, extinguished, VFI 3 OD  poorly reliable, SAD/IAD with SN clearing, VFI 38 OS -- overall stable    3. Pseudophakia of both eyes  MFIOL  Good lens position  Monitor    Plan:  IOP good on new drop; CPM  - Continue Cosopt 2/2, Zioptan QHS   - [facility] referral  - Dr. Kellogg records release    Today's visit is associated with current and anticipated ongoing medical care related to this patient's single serious/complex condition (glaucoma). Follow up is to be continued indefinitely to monitor the condition.    Return 3 months -- VA, IOP  Next DFE due 12/2025    Makenzie Mccall MD  [facility]

## 2025-02-19 NOTE — PROGRESS NOTES
hvf ou done/ou/rel/fix/coop.good/checked chart for allergies/patient allergic to adhesive.od.-1.00 +0.24 x120/os.-0.75.ej    Assessment /Plan     For exam results, see Encounter Report.    There are no diagnoses linked to this encounter.

## 2025-06-23 PROBLEM — D69.2 OTHER NONTHROMBOCYTOPENIC PURPURA: Status: ACTIVE | Noted: 2025-06-23

## 2025-06-30 ENCOUNTER — PATIENT MESSAGE (OUTPATIENT)
Dept: PSYCHIATRY | Facility: CLINIC | Age: 80
End: 2025-06-30
Payer: MEDICARE